# Patient Record
Sex: MALE | Race: WHITE | Employment: UNEMPLOYED | URBAN - METROPOLITAN AREA
[De-identification: names, ages, dates, MRNs, and addresses within clinical notes are randomized per-mention and may not be internally consistent; named-entity substitution may affect disease eponyms.]

---

## 2020-08-25 ENCOUNTER — APPOINTMENT (OUTPATIENT)
Dept: CT IMAGING | Age: 50
DRG: 176 | End: 2020-08-25
Payer: COMMERCIAL

## 2020-08-25 ENCOUNTER — APPOINTMENT (OUTPATIENT)
Dept: GENERAL RADIOLOGY | Age: 50
DRG: 176 | End: 2020-08-25
Payer: COMMERCIAL

## 2020-08-25 ENCOUNTER — HOSPITAL ENCOUNTER (INPATIENT)
Age: 50
LOS: 2 days | Discharge: HOME | DRG: 176 | End: 2020-08-27
Attending: EMERGENCY MEDICINE | Admitting: INTERNAL MEDICINE
Payer: COMMERCIAL

## 2020-08-25 PROBLEM — I26.99 PULMONARY EMBOLISM, BILATERAL (HCC): Status: ACTIVE | Noted: 2020-08-25

## 2020-08-25 LAB
A/G RATIO: 1.1 (ref 1.1–2.2)
ALBUMIN SERPL-MCNC: 3.9 G/DL (ref 3.4–5)
ALP BLD-CCNC: 78 U/L (ref 40–129)
ALT SERPL-CCNC: 32 U/L (ref 10–40)
ANION GAP SERPL CALCULATED.3IONS-SCNC: 12 MMOL/L (ref 3–16)
APTT: 31.8 SEC (ref 24.2–36.2)
AST SERPL-CCNC: 23 U/L (ref 15–37)
BASOPHILS ABSOLUTE: 0.1 K/UL (ref 0–0.2)
BASOPHILS RELATIVE PERCENT: 0.7 %
BILIRUB SERPL-MCNC: 1.4 MG/DL (ref 0–1)
BILIRUBIN URINE: NEGATIVE
BLOOD, URINE: NEGATIVE
BUN BLDV-MCNC: 8 MG/DL (ref 7–20)
CALCIUM SERPL-MCNC: 9.2 MG/DL (ref 8.3–10.6)
CHLORIDE BLD-SCNC: 103 MMOL/L (ref 99–110)
CLARITY: CLEAR
CO2: 24 MMOL/L (ref 21–32)
COLOR: YELLOW
CREAT SERPL-MCNC: 0.9 MG/DL (ref 0.9–1.3)
D DIMER: 608 NG/ML DDU (ref 0–229)
EKG ATRIAL RATE: 101 BPM
EKG DIAGNOSIS: NORMAL
EKG P AXIS: 45 DEGREES
EKG P-R INTERVAL: 142 MS
EKG Q-T INTERVAL: 334 MS
EKG QRS DURATION: 92 MS
EKG QTC CALCULATION (BAZETT): 433 MS
EKG R AXIS: 38 DEGREES
EKG T AXIS: -2 DEGREES
EKG VENTRICULAR RATE: 101 BPM
EOSINOPHILS ABSOLUTE: 0 K/UL (ref 0–0.6)
EOSINOPHILS RELATIVE PERCENT: 0.4 %
GFR AFRICAN AMERICAN: >60
GFR NON-AFRICAN AMERICAN: >60
GLOBULIN: 3.5 G/DL
GLUCOSE BLD-MCNC: 105 MG/DL (ref 70–99)
GLUCOSE URINE: NEGATIVE MG/DL
HCT VFR BLD CALC: 40.1 % (ref 40.5–52.5)
HEMOGLOBIN: 13.5 G/DL (ref 13.5–17.5)
INR BLD: 1.15 (ref 0.86–1.14)
KETONES, URINE: ABNORMAL MG/DL
LACTIC ACID: 1.1 MMOL/L (ref 0.4–2)
LEUKOCYTE ESTERASE, URINE: NEGATIVE
LYMPHOCYTES ABSOLUTE: 1.1 K/UL (ref 1–5.1)
LYMPHOCYTES RELATIVE PERCENT: 9.7 %
MCH RBC QN AUTO: 29.1 PG (ref 26–34)
MCHC RBC AUTO-ENTMCNC: 33.7 G/DL (ref 31–36)
MCV RBC AUTO: 86.5 FL (ref 80–100)
MICROSCOPIC EXAMINATION: ABNORMAL
MONOCYTES ABSOLUTE: 1.2 K/UL (ref 0–1.3)
MONOCYTES RELATIVE PERCENT: 10.6 %
NEUTROPHILS ABSOLUTE: 8.9 K/UL (ref 1.7–7.7)
NEUTROPHILS RELATIVE PERCENT: 78.6 %
NITRITE, URINE: NEGATIVE
PDW BLD-RTO: 12.7 % (ref 12.4–15.4)
PH UA: 7.5 (ref 5–8)
PLATELET # BLD: 318 K/UL (ref 135–450)
PMV BLD AUTO: 8 FL (ref 5–10.5)
POTASSIUM REFLEX MAGNESIUM: 4 MMOL/L (ref 3.5–5.1)
PRO-BNP: 83 PG/ML (ref 0–124)
PROTEIN UA: NEGATIVE MG/DL
PROTHROMBIN TIME: 13.4 SEC (ref 10–13.2)
RBC # BLD: 4.63 M/UL (ref 4.2–5.9)
SODIUM BLD-SCNC: 139 MMOL/L (ref 136–145)
SPECIFIC GRAVITY UA: 1.01 (ref 1–1.03)
TOTAL PROTEIN: 7.4 G/DL (ref 6.4–8.2)
TROPONIN: <0.01 NG/ML
URINE TYPE: ABNORMAL
UROBILINOGEN, URINE: 0.2 E.U./DL
WBC # BLD: 11.3 K/UL (ref 4–11)

## 2020-08-25 PROCEDURE — 84484 ASSAY OF TROPONIN QUANT: CPT

## 2020-08-25 PROCEDURE — 6360000002 HC RX W HCPCS: Performed by: INTERNAL MEDICINE

## 2020-08-25 PROCEDURE — 83605 ASSAY OF LACTIC ACID: CPT

## 2020-08-25 PROCEDURE — 6360000002 HC RX W HCPCS: Performed by: EMERGENCY MEDICINE

## 2020-08-25 PROCEDURE — 6370000000 HC RX 637 (ALT 250 FOR IP): Performed by: INTERNAL MEDICINE

## 2020-08-25 PROCEDURE — 80053 COMPREHEN METABOLIC PANEL: CPT

## 2020-08-25 PROCEDURE — 36415 COLL VENOUS BLD VENIPUNCTURE: CPT

## 2020-08-25 PROCEDURE — 2580000003 HC RX 258: Performed by: INTERNAL MEDICINE

## 2020-08-25 PROCEDURE — 85730 THROMBOPLASTIN TIME PARTIAL: CPT

## 2020-08-25 PROCEDURE — 6360000004 HC RX CONTRAST MEDICATION: Performed by: EMERGENCY MEDICINE

## 2020-08-25 PROCEDURE — 85610 PROTHROMBIN TIME: CPT

## 2020-08-25 PROCEDURE — U0003 INFECTIOUS AGENT DETECTION BY NUCLEIC ACID (DNA OR RNA); SEVERE ACUTE RESPIRATORY SYNDROME CORONAVIRUS 2 (SARS-COV-2) (CORONAVIRUS DISEASE [COVID-19]), AMPLIFIED PROBE TECHNIQUE, MAKING USE OF HIGH THROUGHPUT TECHNOLOGIES AS DESCRIBED BY CMS-2020-01-R: HCPCS

## 2020-08-25 PROCEDURE — 99285 EMERGENCY DEPT VISIT HI MDM: CPT

## 2020-08-25 PROCEDURE — U0002 COVID-19 LAB TEST NON-CDC: HCPCS

## 2020-08-25 PROCEDURE — 71275 CT ANGIOGRAPHY CHEST: CPT

## 2020-08-25 PROCEDURE — 2060000000 HC ICU INTERMEDIATE R&B

## 2020-08-25 PROCEDURE — 94640 AIRWAY INHALATION TREATMENT: CPT

## 2020-08-25 PROCEDURE — 71046 X-RAY EXAM CHEST 2 VIEWS: CPT

## 2020-08-25 PROCEDURE — 81003 URINALYSIS AUTO W/O SCOPE: CPT

## 2020-08-25 PROCEDURE — 85025 COMPLETE CBC W/AUTO DIFF WBC: CPT

## 2020-08-25 PROCEDURE — 83880 ASSAY OF NATRIURETIC PEPTIDE: CPT

## 2020-08-25 PROCEDURE — 85379 FIBRIN DEGRADATION QUANT: CPT

## 2020-08-25 PROCEDURE — 96374 THER/PROPH/DIAG INJ IV PUSH: CPT

## 2020-08-25 PROCEDURE — 93005 ELECTROCARDIOGRAM TRACING: CPT | Performed by: EMERGENCY MEDICINE

## 2020-08-25 RX ORDER — SODIUM CHLORIDE 0.9 % (FLUSH) 0.9 %
10 SYRINGE (ML) INJECTION PRN
Status: DISCONTINUED | OUTPATIENT
Start: 2020-08-25 | End: 2020-08-27 | Stop reason: HOSPADM

## 2020-08-25 RX ORDER — SODIUM CHLORIDE 0.9 % (FLUSH) 0.9 %
10 SYRINGE (ML) INJECTION EVERY 12 HOURS SCHEDULED
Status: DISCONTINUED | OUTPATIENT
Start: 2020-08-25 | End: 2020-08-27 | Stop reason: HOSPADM

## 2020-08-25 RX ORDER — KETOROLAC TROMETHAMINE 30 MG/ML
15 INJECTION, SOLUTION INTRAMUSCULAR; INTRAVENOUS ONCE
Status: COMPLETED | OUTPATIENT
Start: 2020-08-25 | End: 2020-08-25

## 2020-08-25 RX ORDER — ALBUTEROL SULFATE 90 UG/1
2 AEROSOL, METERED RESPIRATORY (INHALATION) EVERY 6 HOURS PRN
Status: DISCONTINUED | OUTPATIENT
Start: 2020-08-25 | End: 2020-08-27 | Stop reason: HOSPADM

## 2020-08-25 RX ORDER — ACETAMINOPHEN 325 MG/1
650 TABLET ORAL EVERY 6 HOURS PRN
Status: DISCONTINUED | OUTPATIENT
Start: 2020-08-25 | End: 2020-08-27 | Stop reason: HOSPADM

## 2020-08-25 RX ORDER — PROMETHAZINE HYDROCHLORIDE 25 MG/1
12.5 TABLET ORAL EVERY 6 HOURS PRN
Status: DISCONTINUED | OUTPATIENT
Start: 2020-08-25 | End: 2020-08-27 | Stop reason: HOSPADM

## 2020-08-25 RX ORDER — ONDANSETRON 2 MG/ML
4 INJECTION INTRAMUSCULAR; INTRAVENOUS EVERY 6 HOURS PRN
Status: DISCONTINUED | OUTPATIENT
Start: 2020-08-25 | End: 2020-08-27 | Stop reason: HOSPADM

## 2020-08-25 RX ORDER — ACETAMINOPHEN 650 MG/1
650 SUPPOSITORY RECTAL EVERY 6 HOURS PRN
Status: DISCONTINUED | OUTPATIENT
Start: 2020-08-25 | End: 2020-08-27 | Stop reason: HOSPADM

## 2020-08-25 RX ORDER — ACETAMINOPHEN 325 MG/1
650 TABLET ORAL EVERY 4 HOURS PRN
Status: DISCONTINUED | OUTPATIENT
Start: 2020-08-25 | End: 2020-08-25 | Stop reason: SDUPTHER

## 2020-08-25 RX ORDER — POLYETHYLENE GLYCOL 3350 17 G/17G
17 POWDER, FOR SOLUTION ORAL DAILY PRN
Status: DISCONTINUED | OUTPATIENT
Start: 2020-08-25 | End: 2020-08-27 | Stop reason: HOSPADM

## 2020-08-25 RX ADMIN — KETOROLAC TROMETHAMINE 15 MG: 30 INJECTION, SOLUTION INTRAMUSCULAR; INTRAVENOUS at 12:05

## 2020-08-25 RX ADMIN — ACETAMINOPHEN 650 MG: 325 TABLET ORAL at 20:55

## 2020-08-25 RX ADMIN — Medication 10 ML: at 20:55

## 2020-08-25 RX ADMIN — ENOXAPARIN SODIUM 120 MG: 120 INJECTION SUBCUTANEOUS at 20:55

## 2020-08-25 RX ADMIN — ALBUTEROL SULFATE 2 PUFF: 90 AEROSOL, METERED RESPIRATORY (INHALATION) at 20:06

## 2020-08-25 RX ADMIN — IOPAMIDOL 80 ML: 755 INJECTION, SOLUTION INTRAVENOUS at 13:48

## 2020-08-25 ASSESSMENT — PAIN SCALES - GENERAL
PAINLEVEL_OUTOF10: 6
PAINLEVEL_OUTOF10: 1
PAINLEVEL_OUTOF10: 5
PAINLEVEL_OUTOF10: 6
PAINLEVEL_OUTOF10: 3

## 2020-08-25 ASSESSMENT — PAIN DESCRIPTION - LOCATION
LOCATION: RIB CAGE
LOCATION: RIB CAGE
LOCATION: LEG

## 2020-08-25 ASSESSMENT — PAIN DESCRIPTION - ORIENTATION
ORIENTATION: LEFT
ORIENTATION: LEFT

## 2020-08-25 ASSESSMENT — PAIN DESCRIPTION - DESCRIPTORS
DESCRIPTORS: CONSTANT;DISCOMFORT
DESCRIPTORS: CONSTANT

## 2020-08-25 ASSESSMENT — PAIN DESCRIPTION - PAIN TYPE
TYPE: ACUTE PAIN
TYPE: ACUTE PAIN

## 2020-08-25 NOTE — ED NOTES
Report called to [facility] on PCU. Pt will be transferred to PCU.       Adam Horowitz RN  08/25/20 8610

## 2020-08-25 NOTE — ED NOTES
Attempted to call report and was told to call back after clearing up something with nursing supervisor.       Zulma Matthew RN  08/25/20 5698

## 2020-08-25 NOTE — PROGRESS NOTES
Pt arrived to floor in W/C loc x 4 denies c/o presently no cp sob or flank pain, very hungry; declined 4 eye skin assessment , what is visible is without remark. Voided as soon as he got I the room ordered food, neg assessment here for Lovenox states home tomorrow afternoon. See hx sheet assessment sheet , from home with wife, and is up-dating her.

## 2020-08-25 NOTE — PROGRESS NOTES
RESPIRATORY THERAPY ASSESSMENT    Name:  Charlee Ivey Record Number:  [id number]  Age: 51 y.o. Gender: male  : 1970  Today's Date:  2020  Room:  [facility]4303-01    Assessment     Is the patient being admitted for a COPD or Asthma exacerbation? No   (If yes the patient will be seen every 4 hours for the first 24 hours and then reassessed)    Patient Admission Diagnosis  Left flank pain    Allergies  No Known Allergies    Pulmonary History:COVID positive   Home Oxygen Therapy:  room air  Home Respiratory Therapy:None   Current Respiratory Therapy:  Albuterol MDI 2puffs PRN          Respiratory Severity Index(RSI)   Patients with orders for inhalation medications, oxygen, or any therapeutic treatment modality will be placed on Respiratory Protocol. They will be assessed with the first treatment and at least every 72 hours thereafter. The following severity scale will be used to determine frequency of treatment intervention. Smoking History: No Smoking History = 0    Social History  Social History     Tobacco Use    Smoking status: Never Smoker   Substance Use Topics    Alcohol use: Yes     Comment: rarely    Drug use: No       Recent Surgical History: None = 0  Past Surgical History  History reviewed. No pertinent surgical history.     Level of Consciousness: Alert, Oriented, and Cooperative = 0    Level of Activity: Walking unassisted = 0    Respiratory Pattern: Regular Pattern; RR 8-20 = 0    Breath Sounds: Clear = 0    Sputum   ,  ,    Cough: Strong, spontaneous, non-productive = 0    Vital Signs   BP (!) 149/91   Pulse 102   Temp 97.9 °F (36.6 °C) (Oral)   Resp 18   Ht 6' 1\" (1.854 m)   Wt 244 lb 7.8 oz (110.9 kg)   SpO2 (!) 7%   BMI 32.26 kg/m²   SPO2 (COPD values may differ): Greater than or equal to 92% on room air = 0    Peak Flow (asthma only): not applicable = 0    RSI: 0-4 = See once and convert to home regimen or discontinue        Plan       Goals: medication delivery, mobilize retained secretions, volume expansion and improve oxygenation    Patient/caregiver was educated on the proper method of use for Respiratory Care Devices:  Yes      Level of patient/caregiver understanding able to:   xVerbalize understanding   xDemonstrate understanding       ? Teach back        ? Needs reinforcement       ? No available caregiver               ? Other:     Response to education:  Excellent     Is patient being placed on Home Treatment Regimen? N/A    Does the patient have everything they need prior to discharge? NA     Comments: patient seen and chart reviewed    Plan of Care: Continue PRN Albuterol as ordered    Electronically signed by Tricia Patel RCP on 8/25/2020 at 6:05 PM    Respiratory Protocol Guidelines     1. Assessment and treatment by Respiratory Therapy will be initiated for medication and therapeutic interventions upon initiation of aerosolized medication. 2. Physician will be contacted for respiratory rate (RR) greater than 35 breaths per minute. Therapy will be held for heart rate (HR) greater than 140 beats per minute, pending direction from physician. 3. Bronchodilators will be administered via Metered Dose Inhaler (MDI) with spacer when the following criteria are met:  a. Alert and cooperative     b. HR < 140 bpm  c. RR < 30 bpm                d. Can demonstrate a 2-3 second inspiratory hold  4. Bronchodilators will be administered via Hand Held Nebulizer [facility]) to patients when ANY of the following criteria are met  a. Incognizant or uncooperative          b. Patients treated with HHN at Home        c. Unable to demonstrate proper use of MDI with spacer     d. RR > 30 bpm   5. Bronchodilators will be delivered via Metered Dose Inhaler (MDI), HHN, Aerogen to intubated patients on mechanical ventilation. 6. Inhalation medication orders will be delivered and/or substituted as outlined below.     Aerosolized Medications Ordering and Administration Guidelines:    1. All Medications will be ordered by a physician, and their frequency and/or modality will be adjusted as defined by the patients Respiratory Severity Index (RSI) score. 2. If the patient does not have documented COPD, consider discontinuing anticholinergics when RSI is less than 9.  3. If the bronchospasm worsens (increased RSI), then the bronchodilator frequency can be increased to a maximum of every 4 hours. If greater than every 4 hours is required, the physician will be contacted. 4. If the bronchospasm improves, the frequency of the bronchodilator can be decreased, based on the patient's RSI, but not less than home treatment regimen frequency. 5. Bronchodilator(s) will be discontinued if patient has a RSI less than 9 and has received no scheduled or as needed treatment for 72  Hrs. Patients Ordered on a Mucolytic Agent:    1. Must always be administered with a bronchodilator. 2. Discontinue if patient experiences worsened bronchospasm, or secretions have lessened to the point that the patient is able to clear them with a cough. Anti-inflammatory and Combination Medications:    1. If the patient lacks prior history of lung disease, is not using inhaled anti-inflammatory medication at home, and lacks wheezing by examination or by history for at least 24 hours, contact physician for possible discontinuation.

## 2020-08-25 NOTE — PROGRESS NOTES
Pharmacy Progress Note    Enoxaparin 105 mg subcutaneous daily/BID ordered, which provides 1.5 mg/kg/day or 1 mg/kg BID based on patient weight of 108.9 kg. Per protocol, pharmacy has rounded ordered dose to nearest prefilled syringe size. Order has been changed to 100 mg subcutaneous daily/BID. Rounding up or down to the closest prefilled syringe size vs the actual weight-based dose will be a small dose difference that is clinically insignificant to impact patient care. Twice daily dosing:  Weight (kg) Actual Dose (mg) Recommended prefilled syringe size (mg)   30-35 30-35 30   36-49 36-49 40   50-69 50-69 60   70-89 70-89 80     100   110-134 110-134 120   135-159 135-159 150     ? 160 kg : [facility] to contact provider to discuss alternative therapy       Once daily dosing:  Weight (kg) Actual Dose (mg) Recommended prefilled syringe size (mg)   30-33 45-50 40   34-47 51-70.5 60   48-60 72-90 80   61-73  100   74-92 111-138 120    139.5-160.5 150     ? 108 kg : [facility] to contact provider to recommend 1 mg/kg BID dosing      Please call Pharmacy with any questions. Cande Rodriguez, PharmD, [facility] 8/25/2020 4:28 PM       ADDENDUM:  Patient weight updated using scale once admitted to floor. This weight is 110.9 kg. Will update order to 120 mg Lovenox dose given this new weight.

## 2020-08-25 NOTE — ED PROVIDER NOTES
[facility]          ATTENDING PHYSICIAN NOTE       Date of evaluation: 8/25/2020    Chief Complaint     Flank Pain (Pt reports left sided back pain that may be kidney stones or may be muscle pain. Pt states he slept on the couch two days ago. Pt reports this was supposed to be his first day back to work after having COVID. He has a negative result from a few days ago. )      History of Present Illness     Vivi Fischer is a 51 y.o. male who presents with approximately 2 days of left posterior lateral chest pain. Patient reports that recently he was diagnosed with COVID infection, has gradually recovered, had a negative test several days ago, and was feeling essentially back to close to baseline. He reports that he fell asleep on the couch couple of days ago and what he thought was maybe an awkward position woke up with pain in his left posterior chest, caudal to the scapula. He reports it somewhat pleuritic in nature. Not associated with significant cough. Has had some intermittent shortness of breath along the same time. ,  And says that he has had improving exercise tolerance  But on the day of the onset of pain said the walking he was doing \"kicked my butt\" and got significantly worse. He is able to ambulate today without dyspnea. No anterior chest pain. No fever. No extremity swelling. .  Review of Systems     Review of Systems  Pertinent positives and negatives are listed in the HPI; otherwise all systems are reviewed and were negative. Past Medical, Surgical, Family, and Social History     He has a past medical history of Asthma. He has no past surgical history on file. His family history is not on file. He reports that he has never smoked. He does not have any smokeless tobacco history on file. He reports current alcohol use. He reports that he does not use drugs.     Medications     Previous Medications    CETIRIZINE-PSEUDOEPHEDRINE (ZYRTEC-D PO)    Take  by mouth as needed. ONDANSETRON (ZOFRAN ODT) 4 MG DISINTEGRATING TABLET    Take 1 tablet by mouth every 8 hours as needed for Nausea. TAMSULOSIN (FLOMAX) 0.4 MG CAPSULE    Take 1 capsule by mouth daily. Stop taking once stone has passed into the bladder. Allergies     He has No Known Allergies. Physical Exam     INITIAL VITALS: BP: (!) 150/91, Temp: 98.4 °F (36.9 °C), Pulse: 61, Resp: 18, SpO2: 97 %   Physical Exam  Constitutional:       General: He is not in acute distress. Appearance: Normal appearance. HENT:      Head: Normocephalic and atraumatic. Neck:      Musculoskeletal: Normal range of motion. Cardiovascular:      Heart sounds: No murmur. No friction rub. Comments: Mild tachy on my exam- rate ~100's  Pulmonary:      Effort: Pulmonary effort is normal. No respiratory distress. Breath sounds: No wheezing or rales. Comments: Very slight left lateral posterior chest tenderness. Abdominal:      General: Abdomen is flat. Palpations: Abdomen is soft. Tenderness: There is no abdominal tenderness. Musculoskeletal: Normal range of motion. General: No swelling or tenderness. Right lower leg: No edema. Left lower leg: No edema. Skin:     General: Skin is warm and dry. Neurological:      General: No focal deficit present. Mental Status: He is alert and oriented to person, place, and time. Diagnostic Results     EKG   Sinus tachycardia, rate 101, HI interval 142, QRS narrow 92 ms, QTc 433. No previous available for comparison, but note of some mild T wave inversion and ST changes in the inferior leads and T wave flattening in the precordial leads    RADIOLOGY:  CTA PULMONARY W CONTRAST   Final Result   1. Bilateral pulmonary emboli, predominantly segmental and subsegmental. No definitive evidence for right heart strain by CT.    2. Diffuse bilateral groundglass opacities, predominantly peripheral. More confluent opacities of the lung bases, particularly left lung base. Findings are classic for those seen in viral or atypical pneumonia such as Covid19 pneumonia. Findings were discussed with Dr. Wolf Grew at 1410 hours. XR CHEST (2 VW)   Final Result      Minimal bibasilar linear opacity, likely scarring or atelectasis. No acute radiographic abnormality.           LABS:   Results for orders placed or performed during the hospital encounter of 08/25/20   Troponin   Result Value Ref Range    Troponin <0.01 <0.01 ng/mL   Brain Natriuretic Peptide   Result Value Ref Range    Pro-BNP 83 0 - 124 pg/mL   CBC Auto Differential   Result Value Ref Range    WBC 11.3 (H) 4.0 - 11.0 K/uL    RBC 4.63 4.20 - 5.90 M/uL    Hemoglobin 13.5 13.5 - 17.5 g/dL    Hematocrit 40.1 (L) 40.5 - 52.5 %    MCV 86.5 80.0 - 100.0 fL    MCH 29.1 26.0 - 34.0 pg    MCHC 33.7 31.0 - 36.0 g/dL    RDW 12.7 12.4 - 15.4 %    Platelets 032 983 - 846 K/uL    MPV 8.0 5.0 - 10.5 fL    Neutrophils % 78.6 %    Lymphocytes % 9.7 %    Monocytes % 10.6 %    Eosinophils % 0.4 %    Basophils % 0.7 %    Neutrophils Absolute 8.9 (H) 1.7 - 7.7 K/uL    Lymphocytes Absolute 1.1 1.0 - 5.1 K/uL    Monocytes Absolute 1.2 0.0 - 1.3 K/uL    Eosinophils Absolute 0.0 0.0 - 0.6 K/uL    Basophils Absolute 0.1 0.0 - 0.2 K/uL   Comprehensive Metabolic Panel w/ Reflex to MG   Result Value Ref Range    Sodium 139 136 - 145 mmol/L    Potassium reflex Magnesium 4.0 3.5 - 5.1 mmol/L    Chloride 103 99 - 110 mmol/L    CO2 24 21 - 32 mmol/L    Anion Gap 12 3 - 16    Glucose 105 (H) 70 - 99 mg/dL    BUN 8 7 - 20 mg/dL    CREATININE 0.9 0.9 - 1.3 mg/dL    GFR Non-African American >60 >60    GFR African American >60 >60    Calcium 9.2 8.3 - 10.6 mg/dL    Total Protein 7.4 6.4 - 8.2 g/dL    Alb 3.9 3.4 - 5.0 g/dL    Albumin/Globulin Ratio 1.1 1.1 - 2.2    Total Bilirubin 1.4 (H) 0.0 - 1.0 mg/dL    Alkaline Phosphatase 78 40 - 129 U/L    ALT 32 10 - 40 U/L    AST 23 15 - 37 U/L    Globulin 3.5 g/dL   Lactic Acid, Plasma   Result Value Ref Range    Lactic Acid 1.1 0.4 - 2.0 mmol/L   D-Dimer, Quantitative   Result Value Ref Range    D-Dimer, Quant 608 (H) 0 - 229 ng/mL DDU   Urinalysis, reflex to microscopic   Result Value Ref Range    Color, UA Yellow Straw/Yellow    Clarity, UA Clear Clear    Glucose, Ur Negative Negative mg/dL    Bilirubin Urine Negative Negative    Ketones, Urine TRACE (A) Negative mg/dL    Specific Gravity, UA 1.015 1.005 - 1.030    Blood, Urine Negative Negative    pH, UA 7.5 5.0 - 8.0    Protein, UA Negative Negative mg/dL    Urobilinogen, Urine 0.2 <2.0 E.U./dL    Nitrite, Urine Negative Negative    Leukocyte Esterase, Urine Negative Negative    Microscopic Examination Not Indicated     Urine Type NotGiven    Protime-INR   Result Value Ref Range    Protime 13.4 (H) 10.0 - 13.2 sec    INR 1.15 (H) 0.86 - 1.14   APTT   Result Value Ref Range    aPTT 31.8 24.2 - 36.2 sec   EKG 12 Lead   Result Value Ref Range    Ventricular Rate 101 BPM    Atrial Rate 101 BPM    P-R Interval 142 ms    QRS Duration 92 ms    Q-T Interval 334 ms    QTc Calculation (Bazett) 433 ms    P Axis 45 degrees    R Axis 38 degrees    T Axis -2 degrees    Diagnosis       EKG performed in ER and to be interpreted by ER physician. Confirmed by MD, ER ([id number]),  Wallace Michel (INGRIS), LARRY ([id number]) on 8/25/2020 12:34:39 PM       ED BEDSIDE ULTRASOUND:      RECENT VITALS:  BP: (!) 151/97,Temp: 98.4 °F (36.9 °C), Pulse: 97, Resp: 16, SpO2: 98 %     Procedures         ED Course     Nursing Notes, Past Medical Hx, Past Surgical Hx, Social Hx,Allergies, and Family Hx were reviewed.     patient was given the following medications:  Orders Placed This Encounter   Medications    ketorolac (TORADOL) injection 15 mg    iopamidol (ISOVUE-370) 76 % injection 80 mL    enoxaparin (LOVENOX) injection 105 mg       CONSULTS:  IP CONSULT TO HOSPITALIST    MEDICAL DECISIONMAKING / ASSESSMENT / Shahzad morales 51 y.o. male who presents with left-sided posterior thoracic pain, pleuritic, associated with an increased feeling of shortness of breath. Lab work-up shows a positive d-dimer, the patient was found on CTPA to have bilateral segmental and subsegmental PEs, as well as radiographic findings consistent with coronavirus. He has recent diagnosis of COVID, reportedly had a negative COVID swab that he just got the results back from, and was just cleared to return to work following his recovery when this happened; COVID swab was sent here as well. Troponin and BNP within normal limits. Patient is not requiring oxygen, but is tachycardic into the low 100s at time my examination. Consideration was given for outpatient treatment for this patient; however, as the patient does have radiographic findings consistent with coronavirus which does not appear entirely resolved, history of structural lung disease and asthma, tachycardia, and would be going home alone at least for the next couple of days, he does not appear in the low risk categories by HESTIA criteria, as well as clinician gestalt that he appears significantly at risk. While we presumed that the etiology of his hypercoagulability is COVID itself, this is not entirely clear, and perhaps more concernedly, the patient developed the right-sided chest pain while in the emergency department, did not have it prior to that, and could thus be showering additional clots in addition to the left-sided ones. Subsequently I feel patient's best served with admission to the hospital.  Spoke to hospitalist.  We will start patient on Lovenox. Obtained additional COVID swab. Clinical Impression     1.  Bilateral pulmonary embolism [facility])        Disposition     PATIENT REFERRED TO:  Salomon Holguin  [address] [place] [id number] [phone]            DISCHARGE MEDICATIONS:  New Prescriptions    No medications on file       DISPOSITION Admitted 08/25/2020 03:01:55 PM         Rosaura Montemayor MD  08/25/20 1537

## 2020-08-25 NOTE — H&P
HOSPITALISTS HISTORY AND PHYSICAL    8/25/2020 4:51 PM    Patient Information:  Thelma Li is a 51 y.o. male [id number]  PCP:  Lore Felix (Tel: [phone] )    Chief complaint:    Chief Complaint   Patient presents with    Flank Pain     Pt reports left sided back pain that may be kidney stones or may be muscle pain. Pt states he slept on the couch two days ago. Pt reports this was supposed to be his first day back to work after having COVID. He has a negative result from a few days ago. History of Present Illness:  Le Hayden is a 51 y.o. male who presented with bilateral pain on the flanks more on left side as compared to right side going on for last 2 days with some shortness of breath. Pain worse with deep breathing. Patient did had a positive COVID on 9 August.  Did had febrile symptoms. Mention was sick for 10 days. Mentioned that almost slept for 20 hours a day. Did had a repeat negative test on 21 August.  Confirmed with looking at test results which patient had with him. Patient denies any personal or family history of blood clot. No recent travel. No leg pain, no history of bleeding, no hemoptysis. History obtained from patient and   Old medical records show       REVIEW OF SYSTEMS:   All other ROS negative except mentioned in [address]      Past Medical History:   has a past medical history of Asthma. Past Surgical History:   has no past surgical history on file. Medications:  No current facility-administered medications on file prior to encounter. Current Outpatient Medications on File Prior to Encounter   Medication Sig Dispense Refill    Cetirizine-Pseudoephedrine (ZYRTEC-D PO) Take  by mouth as needed.  ondansetron (ZOFRAN ODT) 4 MG disintegrating tablet Take 1 tablet by mouth every 8 hours as needed for Nausea.  20 tablet 0    tamsulosin (FLOMAX) 0.4 MG capsule Take 1 capsule by mouth daily. Stop taking once stone has passed into the bladder. 10 capsule 0       Allergies:  No Known Allergies     Social History:  Patient Lives with his wife   reports that he has never smoked. He does not have any smokeless tobacco history on file. He reports current alcohol use. He reports that he does not use drugs. Family History:  family history is not on file. , No family h/o blood clots, h/o cancer in the family    Physical Exam:  BP (!) 149/91   Pulse 102   Temp 97.9 °F (36.6 °C) (Oral)   Resp 18   Ht 6' 1\" (1.854 m)   Wt 244 lb 7.8 oz (110.9 kg)   SpO2 (!) 7%   BMI 32.26 kg/m²     General appearance:  Appears comfortable. Well nourished  Eyes: Sclera clear, pupils equal  ENT: Moist mucus membranes, no thrush. Trachea midline. Cardiovascular: Regular rhythm, normal S1, S2. No murmur, gallop, rub. No edema in lower extremities  Respiratory: Clear to auscultation bilaterally, no wheeze, good inspiratory effort  Gastrointestinal: Abdomen soft, non-tender, not distended, normal bowel sounds  Musculoskeletal: No cyanosis in digits, neck supple  Neurology: Cranial nerves grossly intact. Alert and oriented in time, place and person. No speech or motor deficits  Psychiatry: Appropriate affect.  Not agitated  Skin: Warm, dry, normal turgor, no rash  Brisk capillary refill, peripheral pulses palpable   Labs:  CBC:   Lab Results   Component Value Date    WBC 11.3 08/25/2020    RBC 4.63 08/25/2020    HGB 13.5 08/25/2020    HCT 40.1 08/25/2020    MCV 86.5 08/25/2020    MCH 29.1 08/25/2020    MCHC 33.7 08/25/2020    RDW 12.7 08/25/2020     08/25/2020    MPV 8.0 08/25/2020     BMP:    Lab Results   Component Value Date     08/25/2020    K 4.0 08/25/2020     08/25/2020    CO2 24 08/25/2020    BUN 8 08/25/2020    CREATININE 0.9 08/25/2020    CALCIUM 9.2 08/25/2020    GFRAA >60 08/25/2020    LABGLOM >60 08/25/2020    GLUCOSE 105 08/25/2020     CTA inadvertently. If you may need any clarification, please do not hesitate to contact me through [facility]'S \[facility] [place] [facility]\"".        Darral Fabry, MD    8/25/2020 4:51 PM

## 2020-08-25 NOTE — ED NOTES
Pt updated on POC. Call light in reach will continue to monitor.       Gurdeep Oconnell RN  08/25/20 [id number]

## 2020-08-25 NOTE — ED NOTES
Attempted to call report RN did not answer will attempt to call again in a few minutes.       Eloy Escobedo, LIVAN  08/25/20 8814

## 2020-08-26 LAB
ANION GAP SERPL CALCULATED.3IONS-SCNC: 10 MMOL/L (ref 3–16)
BUN BLDV-MCNC: 8 MG/DL (ref 7–20)
CALCIUM SERPL-MCNC: 9 MG/DL (ref 8.3–10.6)
CHLORIDE BLD-SCNC: 101 MMOL/L (ref 99–110)
CO2: 24 MMOL/L (ref 21–32)
CREAT SERPL-MCNC: 1 MG/DL (ref 0.9–1.3)
GFR AFRICAN AMERICAN: >60
GFR NON-AFRICAN AMERICAN: >60
GLUCOSE BLD-MCNC: 102 MG/DL (ref 70–99)
HCT VFR BLD CALC: 40.1 % (ref 40.5–52.5)
HEMOGLOBIN: 13.6 G/DL (ref 13.5–17.5)
MCH RBC QN AUTO: 29.3 PG (ref 26–34)
MCHC RBC AUTO-ENTMCNC: 34 G/DL (ref 31–36)
MCV RBC AUTO: 86.1 FL (ref 80–100)
PDW BLD-RTO: 12.9 % (ref 12.4–15.4)
PLATELET # BLD: 320 K/UL (ref 135–450)
PMV BLD AUTO: 7.7 FL (ref 5–10.5)
POTASSIUM REFLEX MAGNESIUM: 4 MMOL/L (ref 3.5–5.1)
RBC # BLD: 4.66 M/UL (ref 4.2–5.9)
SARS-COV-2, PCR: DETECTED
SODIUM BLD-SCNC: 135 MMOL/L (ref 136–145)
WBC # BLD: 11.3 K/UL (ref 4–11)

## 2020-08-26 PROCEDURE — 2580000003 HC RX 258: Performed by: INTERNAL MEDICINE

## 2020-08-26 PROCEDURE — 80048 BASIC METABOLIC PNL TOTAL CA: CPT

## 2020-08-26 PROCEDURE — 94640 AIRWAY INHALATION TREATMENT: CPT

## 2020-08-26 PROCEDURE — 6360000002 HC RX W HCPCS: Performed by: INTERNAL MEDICINE

## 2020-08-26 PROCEDURE — 6370000000 HC RX 637 (ALT 250 FOR IP): Performed by: INTERNAL MEDICINE

## 2020-08-26 PROCEDURE — 2060000000 HC ICU INTERMEDIATE R&B

## 2020-08-26 PROCEDURE — 85027 COMPLETE CBC AUTOMATED: CPT

## 2020-08-26 PROCEDURE — 99254 IP/OBS CNSLTJ NEW/EST MOD 60: CPT | Performed by: INTERNAL MEDICINE

## 2020-08-26 RX ORDER — TRAMADOL HYDROCHLORIDE 50 MG/1
50 TABLET ORAL EVERY 6 HOURS PRN
Status: DISCONTINUED | OUTPATIENT
Start: 2020-08-26 | End: 2020-08-27 | Stop reason: HOSPADM

## 2020-08-26 RX ORDER — TRAMADOL HYDROCHLORIDE 50 MG/1
100 TABLET ORAL EVERY 6 HOURS PRN
Status: DISCONTINUED | OUTPATIENT
Start: 2020-08-26 | End: 2020-08-26 | Stop reason: SDUPTHER

## 2020-08-26 RX ORDER — TRAMADOL HYDROCHLORIDE 50 MG/1
100 TABLET ORAL EVERY 6 HOURS PRN
Status: DISCONTINUED | OUTPATIENT
Start: 2020-08-26 | End: 2020-08-27 | Stop reason: HOSPADM

## 2020-08-26 RX ORDER — TRAMADOL HYDROCHLORIDE 50 MG/1
50 TABLET ORAL ONCE
Status: COMPLETED | OUTPATIENT
Start: 2020-08-26 | End: 2020-08-26

## 2020-08-26 RX ORDER — TRAMADOL HYDROCHLORIDE 50 MG/1
50 TABLET ORAL EVERY 6 HOURS PRN
Status: DISCONTINUED | OUTPATIENT
Start: 2020-08-26 | End: 2020-08-26 | Stop reason: SDUPTHER

## 2020-08-26 RX ADMIN — TRAMADOL HYDROCHLORIDE 100 MG: 50 TABLET, FILM COATED ORAL at 20:37

## 2020-08-26 RX ADMIN — ACETAMINOPHEN 650 MG: 325 TABLET ORAL at 08:33

## 2020-08-26 RX ADMIN — ALBUTEROL SULFATE 2 PUFF: 90 AEROSOL, METERED RESPIRATORY (INHALATION) at 05:04

## 2020-08-26 RX ADMIN — ALBUTEROL SULFATE 2 PUFF: 90 AEROSOL, METERED RESPIRATORY (INHALATION) at 11:41

## 2020-08-26 RX ADMIN — Medication 10 ML: at 08:36

## 2020-08-26 RX ADMIN — ACETAMINOPHEN 650 MG: 325 TABLET ORAL at 14:51

## 2020-08-26 RX ADMIN — ENOXAPARIN SODIUM 120 MG: 120 INJECTION SUBCUTANEOUS at 08:35

## 2020-08-26 RX ADMIN — ENOXAPARIN SODIUM 100 MG: 100 INJECTION SUBCUTANEOUS at 22:07

## 2020-08-26 RX ADMIN — TRAMADOL HYDROCHLORIDE 100 MG: 50 TABLET, FILM COATED ORAL at 16:48

## 2020-08-26 RX ADMIN — ACETAMINOPHEN 650 MG: 325 TABLET ORAL at 03:21

## 2020-08-26 RX ADMIN — TRAMADOL HYDROCHLORIDE 50 MG: 50 TABLET, FILM COATED ORAL at 12:45

## 2020-08-26 RX ADMIN — Medication 10 ML: at 20:38

## 2020-08-26 RX ADMIN — TRAMADOL HYDROCHLORIDE 50 MG: 50 TABLET, FILM COATED ORAL at 10:49

## 2020-08-26 RX ADMIN — ACETAMINOPHEN 650 MG: 325 TABLET ORAL at 20:37

## 2020-08-26 ASSESSMENT — PAIN - FUNCTIONAL ASSESSMENT
PAIN_FUNCTIONAL_ASSESSMENT: ACTIVITIES ARE NOT PREVENTED
PAIN_FUNCTIONAL_ASSESSMENT: ACTIVITIES ARE NOT PREVENTED

## 2020-08-26 ASSESSMENT — PAIN SCALES - GENERAL
PAINLEVEL_OUTOF10: 3
PAINLEVEL_OUTOF10: 5
PAINLEVEL_OUTOF10: 5
PAINLEVEL_OUTOF10: 0
PAINLEVEL_OUTOF10: 3
PAINLEVEL_OUTOF10: 2
PAINLEVEL_OUTOF10: 3
PAINLEVEL_OUTOF10: 5
PAINLEVEL_OUTOF10: 4
PAINLEVEL_OUTOF10: 3
PAINLEVEL_OUTOF10: 6
PAINLEVEL_OUTOF10: 3
PAINLEVEL_OUTOF10: 3
PAINLEVEL_OUTOF10: 4
PAINLEVEL_OUTOF10: 0

## 2020-08-26 ASSESSMENT — PAIN DESCRIPTION - DESCRIPTORS
DESCRIPTORS: ACHING;SHARP
DESCRIPTORS: CONSTANT;SHOOTING;SHARP
DESCRIPTORS: SHOOTING;SHARP;CONSTANT

## 2020-08-26 ASSESSMENT — PAIN DESCRIPTION - FREQUENCY
FREQUENCY: CONTINUOUS

## 2020-08-26 ASSESSMENT — PAIN DESCRIPTION - LOCATION
LOCATION: RIB CAGE

## 2020-08-26 ASSESSMENT — PAIN DESCRIPTION - PAIN TYPE
TYPE: ACUTE PAIN

## 2020-08-26 ASSESSMENT — PAIN DESCRIPTION - ORIENTATION
ORIENTATION: LEFT

## 2020-08-26 ASSESSMENT — PAIN DESCRIPTION - PROGRESSION
CLINICAL_PROGRESSION: NOT CHANGED
CLINICAL_PROGRESSION: NOT CHANGED

## 2020-08-26 ASSESSMENT — PAIN DESCRIPTION - ONSET
ONSET: ON-GOING

## 2020-08-26 NOTE — CARE COORDINATION
Case Management Assessment           Initial Evaluation                Date / Time of Evaluation: 8/26/2020 3:16 PM                 Assessment Completed by: Tara Alonzo    Patient Name: Dia Liu     YOB: 1970  Diagnosis: Pulmonary embolism, bilateral ([facility] [I26.99]     Date / Time: 8/25/2020 10:30 AM    Patient Admission Status: Inpatient    If patient is discharged prior to next notation, then this note serves as note for discharge by case management. Current PCP: Nya Kirkland Patient: No    Chart Reviewed: Yes  Patient/ Family Interviewed: Yes    Initial assessment completed at bedside with: patient over the phone due to isolation    Hospitalization in the last 30 days: No    Emergency Contacts:  Extended Emergency Contact Information  Primary Emergency Contact: Eloise Amaro  Address: [place] [address].           [address] Phone: [phone]  Work Phone: [phone]  Relation: Spouse  Secondary Emergency Contact: Eloise Amaro  Address: [place] [address].           [address] Phone: [phone]  Relation: Spouse    Advance Directives:   Code Status: Full Code      Financial  Payor: /     Pre-cert required for SNF: Yes    [address] [place] [address] [phone]  [address] [place] [address] FOR JOINT DISEASES [address]  Phone: [phone] Fax: [phone]      Potential assistance Purchasing Medications: Potential Assistance Purchasing Medications: No  Does Patient want to participate in local refill/ meds to beds program?: No    Meds To Beds General Rules:  1. Can ONLY be done Monday- Friday between 8:30am-5pm  2. Prescription(s) must be in pharmacy by 3pm to be filled same day  3. Copy of patient's insurance/ prescription drug card and patient face sheet must be sent along with the prescription(s)  4.  Cost of Rx cannot be added to hospital bill. If financial assistance is needed, please contact unit  or ;  or  CANNOT provide pharmacy voucher for patients co-pays  5. Patients can then  the prescription on their way out of the hospital at discharge, or pharmacy can deliver to the bedside if staff is available. (payment due at time of pick-up or delivery - cash, check, or card accepted)     Able to afford home medications/ co-pay costs: Yes    ADLS  Support Systems: Spouse/Significant Other    PT AM-PAC:   /24  OT AM-PAC:   /24    [place]: from home with spouse  Steps:     Plans to RETURN to current housing: Yes  Barriers to RETURNING to current housing: none    Home Care Information  Currently ACTIVE with [address]: No  Home Care Agency: Not Applicable          Durable Medical Equipment  DME Provider: n/a  Equipment: n/a        DISCHARGE PLAN:  Disposition: Home- No Services Needed    Transportation PLAN for discharge: self     Factors facilitating achievement of predicted outcomes: Family support    Barriers to discharge: Medical complications    Additional Case Management Notes:   Patient is from home with spouse, independent pta. No CM needs at this time. Patient drove self to hospital.     CM called pt access and let them know pt has insurance card in room, but is COVID postitive, gave his number. The Plan for Transition of Care is related to the following treatment goals of Pulmonary embolism, bilateral ([address] [I26.99]    The Patient and/or patient representative Royden Simmonds and his family were provided with a choice of provider and agrees with the discharge plan Yes    Freedom of choice list was provided with basic dialogue that supports the patient's individualized plan of care/goals and shares the quality data associated with the providers.  Yes    Care Transition patient: No    Eli Baeza RN  [facility]  Case Management Department  Ph:

## 2020-08-26 NOTE — PROGRESS NOTES
[facility] PROGRESS NOTE    8/26/2020 1:40 PM        Name: Nghia Marquez . Admitted: 8/25/2020  Primary Care Provider: Bryan Reynolds (Tel: [phone])    Brief Course: Patient admitted with bilateral pulmonary embolism. Recent COVID-19 infection. He had a recent negative testing. Repeat COVID testing is positive      CC rib pain    Subjective:  . Patient mentions rib pains more on the left side  11 low-grade fever  Denies any shortness of breath or any cough  No skin rash or ulcers    Reviewed interval ancillary notes    Current Medications  traMADol (ULTRAM) tablet 50 mg, Q6H PRN    Or  traMADol (ULTRAM) tablet 100 mg, Q6H PRN  sodium chloride flush 0.9 % injection 10 mL, 2 times per day  sodium chloride flush 0.9 % injection 10 mL, PRN  acetaminophen (TYLENOL) tablet 650 mg, Q6H PRN    Or  acetaminophen (TYLENOL) suppository 650 mg, Q6H PRN  polyethylene glycol (GLYCOLAX) packet 17 g, Daily PRN  promethazine (PHENERGAN) tablet 12.5 mg, Q6H PRN    Or  ondansetron (ZOFRAN) injection 4 mg, Q6H PRN  perflutren lipid microspheres (DEFINITY) injection 1.65 mg, ONCE PRN  albuterol sulfate  (90 Base) MCG/ACT inhaler 2 puff, Q6H PRN  enoxaparin (LOVENOX) injection 120 mg, BID        Objective:  /76   Pulse 94   Temp 97.9 °F (36.6 °C) (Oral)   Resp 16   Ht 6' 1\" (1.854 m)   Wt 244 lb 7.8 oz (110.9 kg)   SpO2 96%   BMI 32.26 kg/m²     Intake/Output Summary (Last 24 hours) at 8/26/2020 1340  Last data filed at 8/26/2020 1243  Gross per 24 hour   Intake 840 ml   Output 700 ml   Net 140 ml      Wt Readings from Last 3 Encounters:   08/25/20 244 lb 7.8 oz (110.9 kg)       General appearance:  Appears comfortable  Eyes: Sclera clear. Pupils equal.  ENT: Moist oral mucosa. Trachea midline, no adenopathy. Cardiovascular: Regular rhythm, normal S1, S2. No murmur.  No edema in lower extremities  Respiratory: Not using accessory muscles. Good inspiratory effort. Clear to auscultation bilaterally, no wheeze or crackles. GI: Abdomen soft, no tenderness, not distended, normal bowel sounds  Musculoskeletal: No cyanosis in digits, neck supple  Neurology: CN 2-12 grossly intact. No speech or motor deficits  Psych: Normal affect. Alert and oriented in time, place and person  Skin: Warm, dry, normal turgor  Extremity exam shows brisk capillary refill. Peripheral pulses are palpable in lower extremities     Labs and Tests:  CBC:   Recent Labs     08/25/20  1207 08/26/20  0325   WBC 11.3* 11.3*   HGB 13.5 13.6    320     BMP:    Recent Labs     08/25/20  1207 08/26/20  0325    135*   K 4.0 4.0    101   CO2 24 24   BUN 8 8   CREATININE 0.9 1.0   GLUCOSE 105* 102*     Hepatic:   Recent Labs     08/25/20  1207   AST 23   ALT 32   BILITOT 1.4*   ALKPHOS 78     CTA PULMONARY W CONTRAST   Final Result   1. Bilateral pulmonary emboli, predominantly segmental and subsegmental. No definitive evidence for right heart strain by CT. 2. Diffuse bilateral groundglass opacities, predominantly peripheral. More confluent opacities of the lung bases, particularly left lung base. Findings are classic for those seen in viral or atypical pneumonia such as Covid19 pneumonia. Findings were discussed with Dr. Phill Nowak at 1410 hours. XR CHEST (2 VW)   Final Result      Minimal bibasilar linear opacity, likely scarring or atelectasis. No acute radiographic abnormality. VL Extremity Venous Bilateral    (Results Pending)           Problem List  Active Problems:    Pulmonary embolism, bilateral (HCC)  Resolved Problems:    * No resolved hospital problems.  *       Assessment & Plan:   B/l PE  Segmental/subsegmental   Recent COVID 19 infection   No other identifiable risk factors, treated with therapeutic Lovenox at this time, venous Doppler, check echo rule out any right ventricular strain, will get heme-onc input, Doppler and echo unfortunately cannot be done given positive for COVID-19, might have to do outpatient Doppler and echo, intended to give 3 months of anticoagulation with NOAC await hematology input    Recent COVID-19 infection  Repeat testing is positive, patient does have P suggesting that still might have thrombotic complications related to COVID-19, not sure about the infectivity, keep patient on droplet plus precautions, will get ID input, discussed with the patient about positive COVID-19 test results.       IV Access: Peripheral  Cano: No  Diet: DIET GENERAL;  Code:Full Code  DVT PPX Lovenox  Disposition probably tomorrow      Ignacio Elena MD   8/26/2020 1:40 PM

## 2020-08-26 NOTE — PROGRESS NOTES
Per vascular lab COVID-19 protocol, the venous duplex study will not currently be performed due to patient's COVID-19 positive status, confirmed PE, and already on anticoagulation therapy (I.e. limit further exposure and treatment plan will not change). Patient's nurse Geovany Ye was notified.

## 2020-08-26 NOTE — PLAN OF CARE
Problem: Airway Clearance - Ineffective  Goal: Achieve or maintain patent airway  Outcome: Ongoing     Problem: Gas Exchange - Impaired  Goal: Absence of hypoxia  Outcome: Ongoing     Problem: Breathing Pattern - Ineffective  Goal: Ability to achieve and maintain a regular respiratory rate  Outcome: Ongoing     Problem: Isolation Precautions - Risk of Spread of Infection  Goal: Prevent transmission of infection  Outcome: Ongoing     Problem: Fatigue  Goal: Verbalize increase energy and improved vitality  Outcome: Ongoing

## 2020-08-26 NOTE — CONSULTS
Infectious Diseases Inpatient Consult Note    Reason for Consult:   Hx COVID-19 pneumonia, Pulmonary embolis  Requesting Physician:   Dr Wyatt Sanchez  Primary Care Physician:  César Glover  History Obtained From:   Pt, EPIC    Admit Date: 8/25/2020  Hospital Day: 2    CHIEF COMPLAINT:       Chief Complaint   Patient presents with    Flank Pain     Pt reports left sided back pain that may be kidney stones or may be muscle pain. Pt states he slept on the couch two days ago. Pt reports this was supposed to be his first day back to work after having COVID. He has a negative result from a few days ago. HISTORY OF PRESENT ILLNESS:      52 yo man with obesity, asthma    Pt had COVID-19 infection. He developed sx and tested positive on Aug 8. Pt had cough, SOB, fever, loss of taste and smell. Ill until approximately 8/19  Pt had f/u neg test on 8/19 [facility])    Presents with L chest lateral / back pain. Onset on 8/24 evening, worse with inspiration  No cough. No fever    In ED 8/25, afebrile, WBC 11.3, LA 1.1, D-dimer 608. Pul CT / CTA + bilateral pul emboli, bilateral groundglass opacities    Admit, started on enoxaparin 100 sc q 12. Tm 100.2,   SARS CoV-2 positive    Past Medical History:    Past Medical History:   Diagnosis Date    Asthma        Past Surgical History:    History reviewed. No pertinent surgical history. Current Medications:     enoxaparin  100 mg Subcutaneous BID    sodium chloride flush  10 mL Intravenous 2 times per day       Allergies:  Patient has no known allergies. Social History:    TOBACCO:    None   ETOH:    Occasional   DRUGS:   None   MARITAL STATUS:      OCCUPATION:    [employer]    Family History:   No immunodeficiency    REVIEW OF SYSTEMS:    No fever / chills / sweats. No weight loss. No visual change, eye pain, eye discharge. No oral lesion, sore throat, dysphagia. Denies cough / sputum. + chest pain  Denies n / v / abd pain.   No diarrhea. Denies dysuria or change in urinary function. Denies joint swelling or pain. No myalgia, arthralgia. Denies skin changes, itching  Denies focal weakness, sensory change or other neurologic symptom    Denies new / worse depression, psychiatric symptoms  Denies any Endocrine or Hematologic symptoms    PHYSICAL EXAM:      Vitals:    /83   Pulse 88   Temp 98.3 °F (36.8 °C) (Oral)   Resp 18   Ht 6' 1\" (1.854 m)   Wt 244 lb 7.8 oz (110.9 kg)   SpO2 97%   BMI 32.26 kg/m²     GENERAL: No apparent distress. HEENT: Membranes moist, no oral lesion, PERRL  NECK:  Supple, no lymphadenopaty  LUNGS: Diminished BS on L   CARDIAC: RRR, no murmur appreciated  ABD:  + BS, soft / NT  EXT:  No rash, no edema, no lesions  NEURO: No focal neurologic findings  PSYCH: Orientation, sensorium, mood normal  LINES:  Peripheral iv    DATA:    Lab Results   Component Value Date    WBC 11.3 (H) 2020    HGB 13.6 2020    HCT 40.1 (L) 2020    MCV 86.1 2020     2020     Lab Results   Component Value Date    CREATININE 1.0 2020    BUN 8 2020     (L) 2020    K 4.0 2020     2020    CO2 24 2020       Hepatic Function Panel:   Lab Results   Component Value Date    ALKPHOS 78 2020    ALT 32 2020    AST 23 2020    PROT 7.4 2020    BILITOT 1.4 2020    LABALBU 3.9 2020       Micro:  None     Imagin/25 Chest CT / CTA  CTA PULMONARY W CONTRAST   Final Result   1. Bilateral pulmonary emboli, predominantly segmental and subsegmental. No definitive evidence for right heart strain by CT. 2. Diffuse bilateral groundglass opacities, predominantly peripheral. More confluent opacities of the lung bases, particularly left lung base. Findings are classic for those seen in viral or atypical pneumonia such as Covid19 pneumonia.        IMPRESSION:      Patient Active Problem List   Diagnosis    Pulmonary embolism, bilateral ([address]       COVID-19 illness, recovered, f/u PCR positive (not unusual to have pos / neg / pos, reed with high sensitivity PCR that is sentout from [facility]). Unlikely to be infectious.     Bilateral PE, likely had increase risk clotting related to having COVID-19 infection    RECOMMENDATIONS:    No specific treatment for COVID-19    - wound not give steroid, RDV, CP    Anticoagulation     Discussed COVID-19 illness, testing and increase risk for thombosis    Discussed with pt, RN  Fannie Moreno MD

## 2020-08-26 NOTE — CONSULTS
Oncology / Hematology Care - Consultation      Patient name:   Kandice Rascon   Date:     8/26/2020           History of Present Illness:    Due to the goal of minimizing exposure and risk of transmission and spread of COVID, this encounter completed via the review of medical record and discussion with staff. Patient was contacted by phone, history and virtual exam obtained; recommendations discussed. Kandice Rascon is a 51 y.o. male who presented with bilateral pain on the flanks more on left side as compared to right side going on for last 2 days with some shortness of breath. Pain worse with deep breathing.     Patient did had a positive COVID on 9 August.  Did had febrile symptoms. Mention was sick for 10 days. Mentioned that almost slept for 20 hours a day. Did had a repeat negative test on 21 August.  Positive test on August 25th. Found to have bilateral segmental and subsegmental pulmonary emboli. Past Medical History:    Past Medical History:   Diagnosis Date    Asthma        Past Surgical History:    History reviewed. No pertinent surgical history.        Social History:    Social History     Socioeconomic History    Marital status:      Spouse name: Not on file    Number of children: Not on file    Years of education: Not on file    Highest education level: Not on file   Occupational History    Not on file   Social Needs    Financial resource strain: Not on file    Food insecurity     Worry: Not on file     Inability: Not on file    Transportation needs     Medical: Not on file     Non-medical: Not on file   Tobacco Use    Smoking status: Never Smoker   Substance and Sexual Activity    Alcohol use: Yes     Comment: rarely    Drug use: No    Sexual activity: Not on file   Lifestyle    Physical activity     Days per week: Not on file     Minutes per session: Not on file    Stress: Not on file   Relationships    Social connections     Talks on phone: Not on file     Gets together: Not on file     Attends [employer] service: Not on file     Active member of club or organization: Not on file     Attends meetings of clubs or organizations: Not on file     Relationship status: Not on file    Intimate partner violence     Fear of current or ex partner: Not on file     Emotionally abused: Not on file     Physically abused: Not on file     Forced sexual activity: Not on file   Other Topics Concern    Not on file   Social History Narrative    Not on file        Family History:    History reviewed. No pertinent family history.        Allergies:    No Known Allergies       Medications:    Current Facility-Administered Medications   Medication Dose Route Frequency Provider Last Rate Last Dose    traMADol (ULTRAM) tablet 50 mg  50 mg Oral Q6H PRN Evita Briggs MD        Or    traMADol Renetta Chiles) tablet 100 mg  100 mg Oral Q6H PRN Evita Briggs MD        sodium chloride flush 0.9 % injection 10 mL  10 mL Intravenous 2 times per day Evita Briggs MD   10 mL at 08/26/20 0836    sodium chloride flush 0.9 % injection 10 mL  10 mL Intravenous PRN Evita Briggs MD        acetaminophen (TYLENOL) tablet 650 mg  650 mg Oral Q6H PRN Evita Briggs MD   650 mg at 08/26/20 9455    Or    acetaminophen (TYLENOL) suppository 650 mg  650 mg Rectal Q6H PRN Evita Briggs MD        polyethylene glycol (GLYCOLAX) packet 17 g  17 g Oral Daily PRN Evita Briggs MD        promethazine (PHENERGAN) tablet 12.5 mg  12.5 mg Oral Q6H PRN Evita Briggs MD        Or    ondansetron [facility]) injection 4 mg  4 mg Intravenous Q6H PRN Evita Briggs MD        perflutren lipid microspheres (DEFINITY) injection 1.65 mg  1.5 mL Intravenous ONCE PRN Evita Briggs MD        albuterol sulfate  (90 Base) MCG/ACT inhaler 2 puff  2 puff Inhalation Q6H PRN Evita Briggs MD   2 puff at 08/26/20 1141    enoxaparin (LOVENOX) injection 120 mg  120 mg Subcutaneous BID Evita Cedeño MD   120 mg at 08/26/20 5566          Review of Systems:    · History obtained from patient, otherwise, further 10 point ROS is negative        Physical Exam:    /76   Pulse 94   Temp 97.9 °F (36.6 °C) (Oral)   Resp 16   Ht 6' 1\" (1.854 m)   Wt 244 lb 7.8 oz (110.9 kg)   SpO2 96%   BMI 32.26 kg/m²     General appearance: alert and cooperative; no acute distress  Head: NCAT, PERRLA, EOMI; oral and nasopharynx without lesions, dentition adequate repair  Neck: no JVD or thyromegaly  Lungs: clear to auscultation bilaterally; no audible rhonchi, rales, wheezes or crackles  Heart: regular rate and rhythm, S1, S2 normal; no murmurs, rubs or gallops  Abdomen: soft, non-tender and non-distended; normoactive, tympanic bowel sounds; no hepatosplenomegaly  Extremities: no cyanosis, clubbing, edema or asymmetry  Skin: no jaundice, purpura or petechiae  Lymph Nodes:  no auricular, cervical, supraclavicular, axillary, inguinal or popliteal lymphadenopathy  Neurologic:  CN 2-12 intact; no focal motor, sensory or cerebellar deficits        Laboratory Evaluation:      CBC:   Lab Results   Component Value Date    WBC 11.3 08/26/2020    NEUTROABS 8.9 08/25/2020    HGB 13.6 08/26/2020    MCV 86.1 08/26/2020     08/26/2020       BMP:   Lab Results   Component Value Date     08/26/2020    K 4.0 08/26/2020    CO2 24 08/26/2020    BUN 8 08/26/2020    CREATININE 1.0 08/26/2020       HEPATIC:  Lab Results   Component Value Date    AST 23 08/25/2020    ALT 32 08/25/2020    ALKPHOS 78 08/25/2020    PROT 7.4 08/25/2020    BILITOT 1.4 08/25/2020           Radiology Evaluation:    Xr Chest (2 Vw)    Result Date: 8/25/2020  EXAM: XR CHEST (2 VW). DATE: 8/25/2020 11:33 AM. INDICATION: left posterior chest pain COMPARISON: None TECHNIQUE: Standard per department protocol.  FINDINGS: Support devices: None The heart size is normal. Minimal linear basilar opacities. No focal consolidation, pleural fluid collection, or pneumothorax. Osseous structures unremarkable. Minimal bibasilar linear opacity, likely scarring or atelectasis. No acute radiographic abnormality. Cta Pulmonary W Contrast    Result Date: 8/25/2020  EXAM: CTA PULMONARY W CONTRAST INDICATION: left posterior chest pain, elevated d dimer COMPARISON: None. TECHNIQUE: IV Contrast Media and volume: 80 cc OMNI 685 Helically acquired pulmonary embolism protocol. MIP reformats were submitted. Up-to-date CT equipment and radiation dose reduction techniques were employed. FINDINGS: Diagnostic quality: Adequate. Pulmonary emboli: Bilateral pulmonary emboli are noted. Segmental and subsegmental filling defects are noted within the right lower lobar pulmonary arteries. In addition, there are segmental and subsegmental pulmonary emboli within left lower lobar pulmonary arteries. Right heart strain: None Lungs: There are scattered peripheral groundglass pulmonary opacities of the bilateral upper lobes and lower lobes. More confluent opacities are noted of the left lower lobe. Airways: Normal. Heart/Great Vessels: Heart is mildly enlarged. Adenopathy: Visualized mediastinal lymph nodes are noted, favored to be reactive. Upper Abdomen: Normal. Bones: No significant abnormality. Other findings: None. 1. Bilateral pulmonary emboli, predominantly segmental and subsegmental. No definitive evidence for right heart strain by CT. 2. Diffuse bilateral groundglass opacities, predominantly peripheral. More confluent opacities of the lung bases, particularly left lung base. Findings are classic for those seen in viral or atypical pneumonia such as Covid19 pneumonia. Findings were discussed with Dr. Luz Marina Abraham at 1410 hours.           Assessment / Plan:      Pulmonary Embolism  COVID-19      Agree with enoxaparin; begin 1 mg/kg bid - decrease to 100 mg due to BSA  Check anti-Xa level 4 hours after 4th dose, can be completed as outpatient      Defer hypercoag workup, not indicated    Dopplers deferred due to positive COVID  A positive or negative effect does not       Anti-inflammatory effects of heparin/LMWH may offer benefit and anti-viral mechanisms have been demonstrated for factor Xa inhibitors. All patients with COVID-19 who are started on empiric therapeutic anticoagulation for presumed or documented PE should be given a minimum course of 3 months of the therapeutic regimen (provided the patient tolerates treatment without serious bleeding). Thrombus resolution can occur within a few days of effective anticoagulation, so negative results from delayed testing should not be interpreted as implying PE or DVT was not previously present. At 3 months, therapeutic anticoagulation can stop, provided the patient has recovered from COVID-19 and has no ongoing risk factors for thrombosis or other indications for anticoagulation (e.g. atrial fibrillation). As always, thank you for allowing me the opportunity to participate in the care of your patient. Please do not hesitate to contact me with questions or concerns regarding further management. Erick Welsh DO, MS  Oncology/Hematology Care    Please contact via:  1. [employer]  2.   Cell Phone:  [phone]    8/26/2020   2:09 PM

## 2020-08-27 VITALS
TEMPERATURE: 98 F | OXYGEN SATURATION: 93 % | HEIGHT: 73 IN | RESPIRATION RATE: 18 BRPM | SYSTOLIC BLOOD PRESSURE: 134 MMHG | BODY MASS INDEX: 32.4 KG/M2 | DIASTOLIC BLOOD PRESSURE: 82 MMHG | WEIGHT: 244.49 LBS | HEART RATE: 80 BPM

## 2020-08-27 LAB — ANTI-XA LOV TREATMENT: 0.7 IU/ML (ref 0.5–1.2)

## 2020-08-27 PROCEDURE — 6370000000 HC RX 637 (ALT 250 FOR IP): Performed by: INTERNAL MEDICINE

## 2020-08-27 PROCEDURE — 85520 HEPARIN ASSAY: CPT

## 2020-08-27 PROCEDURE — 2580000003 HC RX 258: Performed by: INTERNAL MEDICINE

## 2020-08-27 PROCEDURE — 6360000002 HC RX W HCPCS: Performed by: INTERNAL MEDICINE

## 2020-08-27 PROCEDURE — 99232 SBSQ HOSP IP/OBS MODERATE 35: CPT | Performed by: INTERNAL MEDICINE

## 2020-08-27 RX ORDER — TRAMADOL HYDROCHLORIDE 50 MG/1
100 TABLET ORAL EVERY 6 HOURS PRN
Qty: 15 TABLET | Refills: 0 | Status: SHIPPED | OUTPATIENT
Start: 2020-08-27 | End: 2020-08-30

## 2020-08-27 RX ORDER — PANTOPRAZOLE SODIUM 40 MG/1
40 TABLET, DELAYED RELEASE ORAL
Qty: 30 TABLET | Refills: 0 | Status: SHIPPED | OUTPATIENT
Start: 2020-08-27

## 2020-08-27 RX ORDER — PANTOPRAZOLE SODIUM 40 MG/1
40 TABLET, DELAYED RELEASE ORAL
Status: DISCONTINUED | OUTPATIENT
Start: 2020-08-27 | End: 2020-08-27 | Stop reason: HOSPADM

## 2020-08-27 RX ORDER — MAGNESIUM HYDROXIDE/ALUMINUM HYDROXICE/SIMETHICONE 120; 1200; 1200 MG/30ML; MG/30ML; MG/30ML
30 SUSPENSION ORAL EVERY 6 HOURS PRN
Status: DISCONTINUED | OUTPATIENT
Start: 2020-08-27 | End: 2020-08-27 | Stop reason: HOSPADM

## 2020-08-27 RX ADMIN — ONDANSETRON 4 MG: 2 INJECTION INTRAMUSCULAR; INTRAVENOUS at 06:21

## 2020-08-27 RX ADMIN — ACETAMINOPHEN 650 MG: 325 TABLET ORAL at 02:40

## 2020-08-27 RX ADMIN — ENOXAPARIN SODIUM 100 MG: 100 INJECTION SUBCUTANEOUS at 12:03

## 2020-08-27 RX ADMIN — TRAMADOL HYDROCHLORIDE 100 MG: 50 TABLET, FILM COATED ORAL at 02:40

## 2020-08-27 RX ADMIN — ONDANSETRON 4 MG: 2 INJECTION INTRAMUSCULAR; INTRAVENOUS at 09:28

## 2020-08-27 RX ADMIN — Medication 10 ML: at 09:28

## 2020-08-27 ASSESSMENT — PAIN SCALES - GENERAL
PAINLEVEL_OUTOF10: 1
PAINLEVEL_OUTOF10: 10

## 2020-08-27 NOTE — PLAN OF CARE
Problem: Airway Clearance - Ineffective  Goal: Achieve or maintain patent airway  Outcome: Ongoing   Patient denies any SOB. Oxygen per room air. Lungs clear and diminished. Problem: Body Temperature -  Risk of, Imbalanced  Goal: Ability to maintain a body temperature within defined limits  Outcome: Ongoing   Patient afebrile this shift. Problem: Isolation Precautions - Risk of Spread of Infection  Goal: Prevent transmission of infection  Outcome: Ongoing   Patient positive for Covid-19. Isolation precautions in place. Problem: Pain:  Goal: Pain level will decrease  Description: Pain level will decrease  Outcome: Ongoing   Patient denies any pain at this time.

## 2020-08-27 NOTE — PROGRESS NOTES
ID Follow-up NOTE    CC:   Pul emboli, hx COVID-19 pneumonia  Antibiotics: None     Admit Date: 2020  Hospital Day: 3    Subjective:     Patient feels good -   Less L lateral CP  No SOB, no cough      Objective:     Patient Vitals for the past 8 hrs:   BP Temp Temp src Pulse Resp SpO2   20 0916 123/88 97.9 °F (36.6 °C) Oral 76 20 93 %   20 0242 120/86 98 °F (36.7 °C) Oral 88 18 92 %     I/O last 3 completed shifts: In: 780 [P.O.:780]  Out: 0   No intake/output data recorded. EXAM:  GENERAL: No apparent distress. HEENT: Membranes moist, no oral lesion  NECK:  Supple, no lymphadenopathy  LUNGS: Diminished BS on L  CARDIAC: RRR, no murmur appreciated  ABD:  + BS, soft / NT  EXT:  No rash, no edema, no lesions  NEURO: No focal neurologic findings  PSYCH: Orientation, sensorium, mood normal  LINES:  Peripheral iv       Data Review:  Lab Results   Component Value Date    WBC 11.3 (H) 2020    HGB 13.6 2020    HCT 40.1 (L) 2020    MCV 86.1 2020     2020     Lab Results   Component Value Date    CREATININE 1.0 2020    BUN 8 2020     (L) 2020    K 4.0 2020     2020    CO2 24 2020       Hepatic Function Panel:   Lab Results   Component Value Date    ALKPHOS 78 2020    ALT 32 2020    AST 23 2020    PROT 7.4 2020    BILITOT 1.4 2020    LABALBU 3.9 2020        Micro:  None      Imagin/25 Chest CT / CTA  CTA PULMONARY W CONTRAST   Final Result   1. Bilateral pulmonary emboli, predominantly segmental and subsegmental. No definitive evidence for right heart strain by CT. 2. Diffuse bilateral groundglass opacities, predominantly peripheral. More confluent opacities of the lung bases, particularly left lung base. Findings are classic for those seen in viral or atypical pneumonia such as Covid19 pneumonia.        Scheduled Meds:   enoxaparin  100 mg Subcutaneous BID    sodium chloride flush  10 mL Intravenous 2 times per day       Continuous Infusions:      PRN Meds:  traMADol **OR** traMADol, sodium chloride flush, acetaminophen **OR** acetaminophen, polyethylene glycol, promethazine **OR** ondansetron, perflutren lipid microspheres, albuterol sulfate HFA      Assessment:        COVID-19 illness, recovered, f/u PCR positive (not unusual to have pos / neg / pos, reed with high sensitivity PCR that is sentout from [facility]).   Unlikely to be infectious.     Bilateral PE, likely had increase risk clotting related to having COVID-19 infection      Plan:     No specific treatment for COVID-19    - wound not give steroid, RDV, CP     Anticoagulation      Discussed COVID-19 illness, testing and increase risk for thombosis     Discussed with pt  Matti Simpson MD

## 2020-08-27 NOTE — CARE COORDINATION
Home- No Services Needed    LOC at discharge: Not Applicable  CARLOS Completed: Not Indicated    Notification completed in HENS/PAS?:  Not Applicable    IMM Completed:   Not Indicated    Transportation:  Transportation PLAN for discharge: self   Mode of Transport: Private Car  Reason for medical transport: Not Applicable  Name of Transport Company: Not Applicable  Time of Transport: TBD    Transport form completed: Not Indicated    Home Care:  [address] ordered at discharge: Not [address]: Not Applicable  Orders faxed: No        Referrals made at [facility] for outpatient continued care:  Not Applicable    Additional CM Notes: Patient is from home with spouse, independent pta. No CM needs at this time. The Plan for Transition of Care is related to the following treatment goals of Pulmonary embolism, bilateral ([facility] [address] [I26.99]    The Patient and/or patient representative Nataliia Rg and his family were provided with a choice of provider and agrees with the discharge plan Yes    Freedom of choice list was provided with basic dialogue that supports the patient's individualized plan of care/goals and shares the quality data associated with the providers.  Yes    Care Transitions patient: No    Meche Son RN  [facility] [employer]  Case Management Department  Ph: [phone]  Fax: [phone]

## 2020-08-27 NOTE — PROGRESS NOTES
Oncology / Hematology Care      Patient name:   Rasheed Gilbert   Date:     8/27/2020           Interval History:    Due to the goal of minimizing exposure and risk of transmission and spread of COVID, this encounter completed via the review of medical record and discussion with staff. Patient was neither interviewed nor examined today. Tolerating enoxaparin, no bleeding events. Plts stable.           Allergies:    No Known Allergies       Medications:    Current Facility-Administered Medications   Medication Dose Route Frequency Provider Last Rate Last Dose    pantoprazole (PROTONIX) tablet 40 mg  40 mg Oral QAM AC Evita Lopes MD        aluminum & magnesium hydroxide-simethicone (MAALOX) 200-200-20 MG/5ML suspension 30 mL  30 mL Oral Q6H PRN Evita Lopes MD        traMADol Brynn Balzarine) tablet 50 mg  50 mg Oral Q6H PRN Evita Lopes MD        Or    traMADol Brynn Balzarine) tablet 100 mg  100 mg Oral Q6H PRN Evita Lopes MD   100 mg at 08/27/20 0240    enoxaparin (LOVENOX) injection 100 mg  100 mg Subcutaneous BID Diane Hernandez, DO   100 mg at 08/26/20 2207    sodium chloride flush 0.9 % injection 10 mL  10 mL Intravenous 2 times per day Evita Lopes MD   10 mL at 08/27/20 0068    sodium chloride flush 0.9 % injection 10 mL  10 mL Intravenous PRN Evita Lopes MD        acetaminophen (TYLENOL) tablet 650 mg  650 mg Oral Q6H PRN Evita Lopes MD   650 mg at 08/27/20 0240    Or    acetaminophen (TYLENOL) suppository 650 mg  650 mg Rectal Q6H PRN Evita Lopes MD        polyethylene glycol (GLYCOLAX) packet 17 g  17 g Oral Daily PRN Evita Lopes MD        promethazine (PHENERGAN) tablet 12.5 mg  12.5 mg Oral Q6H PRN Evita Lopes MD        Or    ondansetron [facility]) injection 4 mg  4 mg Intravenous Q6H PRN Evita Lopes MD   4 mg at 08/27/20 2800    perflutren lipid microspheres (DEFINITY) injection 1.65 mg 1.5 mL Intravenous ONCE PRN Evita Pan MD        albuterol sulfate  (90 Base) MCG/ACT inhaler 2 puff  2 puff Inhalation Q6H PRN Evita Pan MD   2 puff at 08/26/20 1141          Review of Systems:    · History obtained from patient, otherwise, further 10 point ROS is negative        Physical Exam:    /88   Pulse 76   Temp 97.9 °F (36.6 °C) (Oral)   Resp 20   Ht 6' 1\" (1.854 m)   Wt 244 lb 7.8 oz (110.9 kg)   SpO2 93%   BMI 32.26 kg/m²     General appearance: alert and cooperative; no acute distress  Head: NCAT, PERRLA, EOMI; oral and nasopharynx without lesions, dentition adequate repair  Neck: no JVD or thyromegaly  Lungs: clear to auscultation bilaterally; no audible rhonchi, rales, wheezes or crackles  Heart: regular rate and rhythm, S1, S2 normal; no murmurs, rubs or gallops  Abdomen: soft, non-tender and non-distended; normoactive, tympanic bowel sounds; no hepatosplenomegaly  Extremities: no cyanosis, clubbing, edema or asymmetry  Skin: no jaundice, purpura or petechiae  Lymph Nodes:  no auricular, cervical, supraclavicular, axillary, inguinal or popliteal lymphadenopathy  Neurologic:  CN 2-12 intact; no focal motor, sensory or cerebellar deficits        Laboratory Evaluation:    CBC:   Lab Results   Component Value Date    WBC 11.3 08/26/2020    NEUTROABS 8.9 08/25/2020    HGB 13.6 08/26/2020    MCV 86.1 08/26/2020     08/26/2020       BMP:   Lab Results   Component Value Date     08/26/2020    K 4.0 08/26/2020    CO2 24 08/26/2020    BUN 8 08/26/2020    CREATININE 1.0 08/26/2020       HEPATIC:  Lab Results   Component Value Date    AST 23 08/25/2020    ALT 32 08/25/2020    ALKPHOS 78 08/25/2020    PROT 7.4 08/25/2020    BILITOT 1.4 08/25/2020           Radiology Evaluation:    Xr Chest (2 Vw)    Result Date: 8/25/2020  EXAM: XR CHEST (2 VW).  DATE: 8/25/2020 11:33 AM. INDICATION: left posterior chest pain COMPARISON: None TECHNIQUE: Standard per COVID-19 Infection      Continue enoxaparin, 100 mg bid ( dose reduced slightly due to obesity )  Anti-Xa level at 2 pm today, after 4th dose this morning  Stable for discharge from heme point of view    I will followup with a virtual telemed visit in 1 week  Then followup in 3 months to decide length of therapy        Anti-inflammatory effects of heparin/LMWH may offer benefit and anti-viral mechanisms have been demonstrated for factor Xa inhibitors. All patients with COVID-19 who are started on empiric therapeutic anticoagulation for presumed or documented PE should be given a minimum course of 3 months of the therapeutic regimen (provided the patient tolerates treatment without serious bleeding). Thrombus resolution can occur within a few days of effective anticoagulation, so negative results from delayed testing should not be interpreted as implying PE or DVT was not previously present. At 3 months, therapeutic anticoagulation can stop, provided the patient has recovered from COVID-19 and has no ongoing risk factors for thrombosis or other indications for anticoagulation (e.g. atrial fibrillation). As always, thank you for allowing me the opportunity to participate in the care of your patient. Please do not hesitate to contact me with questions or concerns regarding further management. Erick Medina DO, MS  Oncology/Hematology Care    Please contact via:  1. [employer]  2.   Cell Phone:  [phone]    8/27/2020   11:08 AM

## 2020-08-27 NOTE — DISCHARGE SUMMARY
[facility] DISCHARGE SUMMARY    Patient Demographics    Patient. Abad Carias  Date of Birth. 1970  MRN. [id number]     Primary care provider. Julianna Fritz  (Tel: [phone])    Admit date: 8/25/2020    Discharge date (blank if same as Note Date): Note Date: 8/27/2020     Reason for Hospitalization. Chief Complaint   Patient presents with    Flank Pain     Pt reports left sided back pain that may be kidney stones or may be muscle pain. Pt states he slept on the couch two days ago. Pt reports this was supposed to be his first day back to work after having COVID. He has a negative result from a few days ago. Significant Findings. Active Problems:    Pulmonary embolism, bilateral (HCC)  Resolved Problems:    * No resolved hospital problems. *       Problems and results from this hospitalization that need follow up. 1. Acute PE    Significant test results and incidental findings. 1.   CTA PULMONARY W CONTRAST   Final Result   1. Bilateral pulmonary emboli, predominantly segmental and subsegmental. No definitive evidence for right heart strain by CT. 2. Diffuse bilateral groundglass opacities, predominantly peripheral. More confluent opacities of the lung bases, particularly left lung base. Findings are classic for those seen in viral or atypical pneumonia such as Covid19 pneumonia. Findings were discussed with Dr. Xavi Obrien at 1410 hours. XR CHEST (2 VW)   Final Result      Minimal bibasilar linear opacity, likely scarring or atelectasis. No acute radiographic abnormality. VL Extremity Venous Bilateral    (Results Pending)     Invasive procedures and treatments. [facility] Course. Patient with a recent history of COVID infection presented with bilateral rib pain found to have bilateral segmental pulmonary embolism. Patient will be discharged home on therapeutic Lovenox for 3-month duration.   Patient need to follow-up with a primary care provider as outpatient. We could not do venous Doppler or echocardiogram during the stay as patient's COVID test came back positive and as per hospital policy they had to wait till it becomes negative. I think getting those tests acutely would not change any management meant decisions at this time. But would benefit from getting an echocardiogram as outpatient. Given the patient febrile and respiratory symptoms has subsided and 10 days ago he is not infective. ID input was obtained during the stay. Consults. IP CONSULT TO HOSPITALIST  IP CONSULT TO ONCOLOGY  IP CONSULT TO INFECTIOUS DISEASES    Physical examination on discharge day. /82   Pulse 80   Temp 98 °F (36.7 °C) (Oral)   Resp 18   Ht 6' 1\" (1.854 m)   Wt 244 lb 7.8 oz (110.9 kg)   SpO2 93%   BMI 32.26 kg/m²   General appearance. Alert. Looks comfortable. HEENT. Sclera clear. Moist mucus membranes. Cardiovascular. Regular rate and rhythm, normal S1, S2. No murmur. Respiratory. Not using accessory muscles. Clear to auscultation bilaterally, no wheeze. Gastrointestinal. Abdomen soft, non-tender, not distended, normal bowel sounds  Neurology. Facial symmetry. No speech deficits. Moving all extremities equally. Extremities. No edema in lower extremities. Skin. Warm, dry, normal turgor        Condition at time of discharge stable    Medication instructions provided to patient at discharge. Medication List      START taking these medications    enoxaparin 100 MG/ML injection  Commonly known as:  Lovenox  Inject 1 mL into the skin 2 times daily     pantoprazole 40 MG tablet  Commonly known as:  PROTONIX  Take 1 tablet by mouth every morning (before breakfast)     traMADol 50 MG tablet  Commonly known as:  ULTRAM  Take 2 tablets by mouth every 6 hours as needed for Pain for up to 3 days.         CONTINUE taking these medications    ondansetron 4 MG disintegrating tablet  Commonly known as:  Zofran ODT  Take 1 tablet by mouth every 8 hours as needed for Nausea. tamsulosin 0.4 MG capsule  Commonly known as:  Flomax  Take 1 capsule by mouth daily. Stop taking once stone has passed into the bladder. ZYRTEC-D PO           Where to Get Your Medications      These medications were sent to [address], [address] [place] [id number]    Phone:  [phone]   · enoxaparin 100 MG/ML injection  · pantoprazole 40 MG tablet     You can get these medications from any pharmacy    Bring a paper prescription for each of these medications  · traMADol 50 MG tablet         Discharge recommendations given to patient. Follow Up. Heme-onc and PCP  Disposition. home  Activity. activity as tolerated  Diet: DIET GENERAL;      Spent 35 minutes in discharge process.     Signed:  Saadia Brown MD     8/27/2020 2:29 PM

## 2020-12-05 ENCOUNTER — HOSPITAL ENCOUNTER (OUTPATIENT)
Age: 50
Discharge: HOME | End: 2020-12-05
Payer: COMMERCIAL

## 2020-12-05 LAB
D DIMER: <200 NG/ML DDU (ref 0–229)
FIBRINOGEN: 269 MG/DL (ref 200–397)

## 2020-12-05 PROCEDURE — 36415 COLL VENOUS BLD VENIPUNCTURE: CPT

## 2020-12-05 PROCEDURE — 85240 CLOT FACTOR VIII AHG 1 STAGE: CPT

## 2020-12-05 PROCEDURE — 85384 FIBRINOGEN ACTIVITY: CPT

## 2020-12-05 PROCEDURE — 85379 FIBRIN DEGRADATION QUANT: CPT

## 2020-12-07 ENCOUNTER — TELEPHONE (OUTPATIENT)
Dept: INTERNAL MEDICINE CLINIC | Age: 50
End: 2020-12-07

## 2020-12-07 LAB — FACTOR VIII ACTIVITY: 129 % (ref 50–150)

## 2020-12-07 NOTE — TELEPHONE ENCOUNTER
LVM for patient to call back to confirm NP appt. [facility] Dr. Mel Escamilla referred patient to Dr. Marina Almonte as PCP.

## 2020-12-30 NOTE — TELEPHONE ENCOUNTER
Patient is looking for a Provider that is available on Saturdays. I gave him the y [address] a doc number.

## 2021-12-16 ENCOUNTER — HOSPITAL ENCOUNTER (EMERGENCY)
Age: 51
Discharge: HOME | End: 2021-12-17
Attending: EMERGENCY MEDICINE
Payer: COMMERCIAL

## 2021-12-16 VITALS
WEIGHT: 255 LBS | TEMPERATURE: 98.2 F | BODY MASS INDEX: 33.8 KG/M2 | HEART RATE: 85 BPM | HEIGHT: 73 IN | SYSTOLIC BLOOD PRESSURE: 162 MMHG | DIASTOLIC BLOOD PRESSURE: 97 MMHG | RESPIRATION RATE: 18 BRPM | OXYGEN SATURATION: 97 %

## 2021-12-16 DIAGNOSIS — M54.32 SCIATICA OF LEFT SIDE: ICD-10-CM

## 2021-12-16 DIAGNOSIS — S39.012A STRAIN OF LUMBAR REGION, INITIAL ENCOUNTER: Primary | ICD-10-CM

## 2021-12-16 PROCEDURE — 96374 THER/PROPH/DIAG INJ IV PUSH: CPT

## 2021-12-16 PROCEDURE — 99283 EMERGENCY DEPT VISIT LOW MDM: CPT

## 2021-12-16 RX ORDER — METHYLPREDNISOLONE 4 MG/1
4 TABLET ORAL
Status: DISCONTINUED | OUTPATIENT
Start: 2021-12-17 | End: 2021-12-16

## 2021-12-16 RX ORDER — NAPROXEN 500 MG/1
500 TABLET ORAL 2 TIMES DAILY PRN
Qty: 30 TABLET | Refills: 0 | Status: SHIPPED | OUTPATIENT
Start: 2021-12-16

## 2021-12-16 RX ORDER — METHYLPREDNISOLONE 4 MG/1
TABLET ORAL
Qty: 1 KIT | Refills: 0 | Status: SHIPPED | OUTPATIENT
Start: 2021-12-16 | End: 2021-12-22

## 2021-12-16 RX ORDER — METHYLPREDNISOLONE 4 MG/1
4 TABLET ORAL NIGHTLY
Status: DISCONTINUED | OUTPATIENT
Start: 2021-12-18 | End: 2021-12-16

## 2021-12-16 RX ORDER — KETOROLAC TROMETHAMINE 30 MG/ML
15 INJECTION, SOLUTION INTRAMUSCULAR; INTRAVENOUS ONCE
Status: COMPLETED | OUTPATIENT
Start: 2021-12-16 | End: 2021-12-17

## 2021-12-16 RX ORDER — METHOCARBAMOL 500 MG/1
1000 TABLET, FILM COATED ORAL 3 TIMES DAILY
Qty: 60 TABLET | Refills: 0 | Status: SHIPPED | OUTPATIENT
Start: 2021-12-16 | End: 2021-12-26

## 2021-12-16 RX ORDER — LIDOCAINE 4 G/G
1 PATCH TOPICAL ONCE
Status: DISCONTINUED | OUTPATIENT
Start: 2021-12-16 | End: 2021-12-17 | Stop reason: HOSPADM

## 2021-12-16 RX ORDER — METHYLPREDNISOLONE 4 MG/1
24 TABLET ORAL ONCE
Status: DISCONTINUED | OUTPATIENT
Start: 2021-12-17 | End: 2021-12-16

## 2021-12-16 RX ORDER — METHOCARBAMOL 500 MG/1
1000 TABLET, FILM COATED ORAL ONCE
Status: COMPLETED | OUTPATIENT
Start: 2021-12-16 | End: 2021-12-17

## 2021-12-16 RX ORDER — METHYLPREDNISOLONE 4 MG/1
8 TABLET ORAL NIGHTLY
Status: DISCONTINUED | OUTPATIENT
Start: 2021-12-17 | End: 2021-12-16

## 2021-12-16 ASSESSMENT — PAIN DESCRIPTION - PAIN TYPE: TYPE: ACUTE PAIN

## 2021-12-16 ASSESSMENT — PAIN DESCRIPTION - LOCATION: LOCATION: BACK;LEG

## 2021-12-16 ASSESSMENT — PAIN SCALES - GENERAL: PAINLEVEL_OUTOF10: 4

## 2021-12-16 ASSESSMENT — PAIN DESCRIPTION - ORIENTATION: ORIENTATION: LEFT

## 2021-12-16 ASSESSMENT — PAIN DESCRIPTION - DESCRIPTORS: DESCRIPTORS: SHARP;SHOOTING

## 2021-12-16 ASSESSMENT — PAIN DESCRIPTION - FREQUENCY: FREQUENCY: CONTINUOUS

## 2021-12-17 PROCEDURE — 6370000000 HC RX 637 (ALT 250 FOR IP): Performed by: PHYSICIAN ASSISTANT

## 2021-12-17 PROCEDURE — 6360000002 HC RX W HCPCS: Performed by: PHYSICIAN ASSISTANT

## 2021-12-17 RX ADMIN — METHOCARBAMOL 1000 MG: 500 TABLET ORAL at 00:09

## 2021-12-17 RX ADMIN — KETOROLAC TROMETHAMINE 15 MG: 30 INJECTION, SOLUTION INTRAMUSCULAR; INTRAVENOUS at 00:12

## 2021-12-17 ASSESSMENT — PAIN SCALES - GENERAL: PAINLEVEL_OUTOF10: 5

## 2021-12-17 NOTE — ED PROVIDER NOTES
ED Attending Attestation Note     Date of evaluation: 12/16/2021    This patient was seen by the advance practice provider. I have seen and examined the patient, agree with the workup, evaluation, management and diagnosis. The care plan has been discussed. My assessment reveals a 79-year-old male with left leg pain worse with heavy lifting and movement. He has full strength and sensation in his L leg and ambulates without difficulty. No midline back pain. Rita Xie MD  12/17/21 0001

## 2021-12-17 NOTE — ED TRIAGE NOTES
Lower back pain started on Tuesday, has been worsening over the past few days. Radiates from lower back down to the left leg, sharp shooting pain.  Saw work MD for pain, and they scheduled a follow up appointment tomorrow morning

## 2021-12-30 ASSESSMENT — ENCOUNTER SYMPTOMS
SORE THROAT: 0
SHORTNESS OF BREATH: 0
NAUSEA: 0
ABDOMINAL DISTENTION: 0
COUGH: 0
RHINORRHEA: 0
COLOR CHANGE: 0
VOMITING: 0
CHEST TIGHTNESS: 0
BACK PAIN: 1
DIARRHEA: 0
WHEEZING: 0
ABDOMINAL PAIN: 0
TROUBLE SWALLOWING: 0
EYE PAIN: 0

## 2021-12-30 NOTE — ED PROVIDER NOTES
[address] ENCOUNTER          PHYSICIAN ASSISTANT NOTE       Date of evaluation: 12/16/2021    Chief Complaint     Back Pain (radiates down to left leg ) and Leg Pain      History of Present Illness     King Fadia is a 49 y.o. male with a past medical history as noted below who presents to the Emergency Department with a complaint of left lower back pain with radiation to the left leg. The patient reports that he began experiencing lower back pain 2 days ago which has been worsening over the past couple of days. The pain radiates from his left lower back through his buttock into his left leg. He describes it as a sharp, shooting pain which radiates to just above the level of the knee. The patient reports that this pain started after lifting heavy items at work. Patient reports that he was evaluated by an occupational medicine provider in the workplace who took x-rays, but he was not told the specific results. He does have a scheduled follow-up appointment with the occupational medicine provider tomorrow morning. The patient reports that the pain has gotten worse, and has made it difficult for him to sit, lie down or sleep. His pain is initially described as a 6 out of 10. The patient has only been taking aspirin for his symptoms. Denies any bowel or bladder dysfunction. No saddle or distal paresthesias. No gait abnormalities. No lower extremity dysfunction. No unexplained weight loss. Denies any IV drug use. Denies any fevers, chills, sweats or other constitutional symptoms. Review of Systems     Review of Systems   Constitutional: Negative for chills, diaphoresis, fatigue and fever. HENT: Negative for congestion, postnasal drip, rhinorrhea, sore throat and trouble swallowing. Eyes: Negative for pain and visual disturbance. Respiratory: Negative for cough, chest tightness, shortness of breath and wheezing.     Cardiovascular: Negative for chest pain, palpitations and leg swelling. Gastrointestinal: Negative for abdominal distention, abdominal pain, diarrhea, nausea and vomiting. Endocrine: Negative for polydipsia and polyuria. Genitourinary: Negative for dysuria. Musculoskeletal: Positive for back pain. Negative for myalgias, neck pain and neck stiffness. Skin: Negative for color change, pallor and rash. Neurological: Negative for dizziness, weakness, light-headedness and headaches. Hematological: Does not bruise/bleed easily. Psychiatric/Behavioral: Negative for confusion, hallucinations, self-injury and suicidal ideas. All other systems reviewed and are negative. Physical Exam     INITIAL VITALS: BP: (!) 162/97, Temp: 98.2 °F (36.8 °C), Pulse: 85, Resp: 18, SpO2: 97 %     Nursing note and vitals reviewed. Physical Exam  Vitals and nursing note reviewed. Constitutional:       General: He is not in acute distress. Appearance: He is well-developed. He is not diaphoretic. HENT:      Head: Normocephalic and atraumatic. Eyes:      Pupils: Pupils are equal, round, and reactive to light. Neck:      Vascular: No JVD. Cardiovascular:      Rate and Rhythm: Normal rate and regular rhythm. Pulmonary:      Effort: Pulmonary effort is normal. No respiratory distress. Breath sounds: Normal breath sounds. No wheezing or rales. Chest:      Chest wall: No tenderness. Abdominal:      General: There is no distension. Palpations: Abdomen is soft. Tenderness: There is no abdominal tenderness. Musculoskeletal:         General: Normal range of motion. Cervical back: Normal range of motion and neck supple. Lumbar back: Spasms and tenderness present. No bony tenderness. Negative right straight leg raise test and negative left straight leg raise test.        Back:       Comments: Bilateral lower extremity sets 5 out of 5 to strength testing. Skin:     General: Skin is warm and dry.       Coloration: Skin is not pale.      Findings: No erythema or rash. Neurological:      Mental Status: He is alert and oriented to person, place, and time. Coordination: Coordination normal.          Procedures     N/A    MEDICAL DECISION MAKING     Rosenda Hidalgo is admitted to the Emergency Department for evaluation of his chief complaint as described in the history of present illness. Complete history and physical was performed by me and my attending. Nursing notes, past medical history, surgical history, family history and social history were reviewed and addressed in the HPI. Rannie Cushing is a 49 y.o. male who presents to the emergency department with a complaint of left-sided lower back pain. Patient reports that 2 days ago, while lifting heavy items at work, he began to experience left-sided lower back pain which radiates into the left lower leg. He describes a sharp, shooting pain. Pain is worse with lifting or bending. Some pain with ambulation. Reports it is hard to find a comfortable position while laying down. On arrival to the emergency department, the patient is hemodynamically stable and within normal limits. Physical examination reveals left latissimus dorsi and gluteus vargas tenderness to palpation from the paraspinous region laterally. The patient has been seen and evaluated for their complaint of back pain. Patient denies recent trauma. The patient denies urinary incontinence, urinary retention, and numbness in the extremities. The patient is neurologically intact, sensation is intact in the lower extremities, dorsalis pedal pulses 2+ bilaterally, and achilles reflex intact bilaterally. I do not feel imaging is necessary at this point bases on absence of trauma, and negative midline tenderness to the spine. It is my impression that based on history and negative physical exam findings the patient is not at risk for Cauda Equina Syndrome, or other spinal injury.  At this point, the patient's nontraumatic back pain is likely musculoskeletal in nature. The patient will be instructed to continue taking anti-inflammatory medication, and given a Rx for short course of muscle relaxers and pain medication to include Robaxin and naproxen. The patient will be given back stretching exercises, and instructed to follow up with their PCP or community clinic for further evaluation. The patient should return to the ED if the back pain worsens, or if they experience incontinence, numbness or tingling in the legs, or inability to ambulate. The patient is in agreement with this plan. I discussed this plan at length the patient who verbalizes understanding and is in agreement. The patient is currently stable and will be discharged home for continued self-care. Please see patient's AVS for additional discharge instructions. The patient was seen and evaluated by myself and the physician assistant student, as well as the attending physician, Omra Tinoco MD, who agrees with my assessment, treatment and plan. Clinical Impression     1. Strain of lumbar region, initial encounter    2. Sciatica of left side        Disposition     DISPOSITION Decision To Discharge 12/16/2021 11:57:47 PM    Talha Arredondo. BRO Bowen  12:02 PM    Relevant History and Diagnostic Information     Past Medical, Surgical, Family, and Social History     He has a past medical history of Asthma and Hyperlipidemia. He has a past surgical history that includes hernia repair. His family history is not on file. He reports that he has never smoked. He does not have any smokeless tobacco history on file. He reports current alcohol use. He reports that he does not use drugs.     Medications     Discharge Medication List as of 12/17/2021 12:20 AM      CONTINUE these medications which have NOT CHANGED    Details   enoxaparin (LOVENOX) 100 MG/ML injection Inject 1 mL into the skin 2 times daily, Disp-60 mL,R-3Normal      pantoprazole (PROTONIX) 40 MG tablet Take 1 tablet by mouth every morning (before breakfast), Disp-30 tablet,R-0Normal      Cetirizine-Pseudoephedrine (ZYRTEC-D PO) Take  by mouth as needed. ondansetron (ZOFRAN ODT) 4 MG disintegrating tablet Take 1 tablet by mouth every 8 hours as needed for Nausea., Disp-20 tablet, R-0      tamsulosin (FLOMAX) 0.4 MG capsule Take 1 capsule by mouth daily. Stop taking once stone has passed into the bladder. , Disp-10 capsule, R-0             Allergies     He has No Known Allergies. ED Course     Nursing Notes, Past Medical Hx, Past Surgical Hx, Social Hx,Allergies, and Family Hx were reviewed. Patient was given the following medications:  Orders Placed This Encounter   Medications    methocarbamol (ROBAXIN) tablet 1,000 mg    ketorolac (TORADOL) injection 15 mg    DISCONTD: lidocaine 4 % external patch 1 patch    methocarbamol (ROBAXIN) 500 MG tablet     Sig: Take 2 tablets by mouth 3 times daily for 10 days     Dispense:  60 tablet     Refill:  0    naproxen (NAPROSYN) 500 MG tablet     Sig: Take 1 tablet by mouth 2 times daily as needed for Pain     Dispense:  30 tablet     Refill:  0    DISCONTD: methylPREDNISolone (MEDROL DOSEPACK) tablet 24 mg    DISCONTD: methylPREDNISolone (MEDROL DOSEPACK) tablet 4 mg    DISCONTD: methylPREDNISolone (MEDROL DOSEPACK) tablet 4 mg    DISCONTD: methylPREDNISolone (MEDROL DOSEPACK) tablet 4 mg    DISCONTD: methylPREDNISolone (MEDROL DOSEPACK) tablet 8 mg    DISCONTD: methylPREDNISolone (MEDROL DOSEPACK) tablet 4 mg    methylPREDNISolone (MEDROL, ROSITA,) 4 MG tablet     Sig: Follow package dosing instructions. Dispense:  1 kit     Refill:  0       Diagnostic Results     RECENT VITALS:  BP: (!) 162/97,Temp: 98.2 °F (36.8 °C), Pulse: 85, Resp: 18, SpO2: 97 %     RADIOLOGY:  No orders to display       LABS:   No results found for this visit on 12/16/21.     CONSULTS:  None    PATIENT REFERRED TO:  Alla Shen  [address] [place] [id number]  [phone]    In 3 days  If symptoms or not improving on prescribed regimen    [facility] [address]  [address] [address]  [phone]  Go to   If symptoms worsen    Maricruz Sharma MD  [address] [facility]  Emily Hernandez [facility]  [phone]    Schedule an appointment as soon as possible for a visit         DISCHARGE MEDICATIONS:  Discharge Medication List as of 12/17/2021 12:20 AM      START taking these medications    Details   methocarbamol (ROBAXIN) 500 MG tablet Take 2 tablets by mouth 3 times daily for 10 days, Disp-60 tablet, R-0Print      naproxen (NAPROSYN) 500 MG tablet Take 1 tablet by mouth 2 times daily as needed for Pain, Disp-30 tablet, R-0Print      methylPREDNISolone (MEDROL, ROSITA,) 4 MG tablet Follow package dosing instructions. , Disp-1 kit, R-0Print              [place], [place]  12/30/21 1224